# Patient Record
Sex: FEMALE | Race: WHITE | NOT HISPANIC OR LATINO | Employment: FULL TIME | URBAN - METROPOLITAN AREA
[De-identification: names, ages, dates, MRNs, and addresses within clinical notes are randomized per-mention and may not be internally consistent; named-entity substitution may affect disease eponyms.]

---

## 2017-07-03 ENCOUNTER — CONVERSION ENCOUNTER (OUTPATIENT)
Dept: GENERAL RADIOLOGY | Facility: HOSPITAL | Age: 42
End: 2017-07-03

## 2018-05-09 ENCOUNTER — OFFICE VISIT CONVERTED (OUTPATIENT)
Dept: UROLOGY | Facility: CLINIC | Age: 43
End: 2018-05-09
Attending: UROLOGY

## 2018-05-09 ENCOUNTER — CONVERSION ENCOUNTER (OUTPATIENT)
Dept: SURGERY | Facility: CLINIC | Age: 43
End: 2018-05-09

## 2018-06-11 ENCOUNTER — OFFICE VISIT CONVERTED (OUTPATIENT)
Dept: UROLOGY | Facility: CLINIC | Age: 43
End: 2018-06-11
Attending: UROLOGY

## 2018-07-16 ENCOUNTER — CONVERSION ENCOUNTER (OUTPATIENT)
Dept: SURGERY | Facility: CLINIC | Age: 43
End: 2018-07-16

## 2018-07-16 ENCOUNTER — OFFICE VISIT CONVERTED (OUTPATIENT)
Dept: UROLOGY | Facility: CLINIC | Age: 43
End: 2018-07-16
Attending: UROLOGY

## 2019-01-16 ENCOUNTER — OFFICE VISIT CONVERTED (OUTPATIENT)
Dept: UROLOGY | Facility: CLINIC | Age: 44
End: 2019-01-16
Attending: UROLOGY

## 2019-02-27 ENCOUNTER — OFFICE VISIT CONVERTED (OUTPATIENT)
Dept: CARDIOLOGY | Facility: CLINIC | Age: 44
End: 2019-02-27
Attending: INTERNAL MEDICINE

## 2019-03-11 ENCOUNTER — HOSPITAL ENCOUNTER (OUTPATIENT)
Dept: GENERAL RADIOLOGY | Facility: HOSPITAL | Age: 44
Discharge: HOME | End: 2019-03-11
Attending: NURSE PRACTITIONER

## 2019-07-20 ENCOUNTER — HOSPITAL ENCOUNTER (OUTPATIENT)
Dept: URGENT CARE | Facility: CLINIC | Age: 44
Discharge: HOME | End: 2019-07-20

## 2019-09-05 ENCOUNTER — OFFICE VISIT CONVERTED (OUTPATIENT)
Dept: CARDIOLOGY | Facility: CLINIC | Age: 44
End: 2019-09-05
Attending: INTERNAL MEDICINE

## 2020-04-23 ENCOUNTER — TELEMEDICINE CONVERTED (OUTPATIENT)
Dept: CARDIOLOGY | Facility: CLINIC | Age: 45
End: 2020-04-23
Attending: INTERNAL MEDICINE

## 2020-07-02 ENCOUNTER — HOSPITAL ENCOUNTER (OUTPATIENT)
Dept: SLEEP MEDICINE | Facility: HOSPITAL | Age: 45
Discharge: HOME | End: 2020-07-02
Attending: INTERNAL MEDICINE

## 2020-07-31 ENCOUNTER — HOSPITAL ENCOUNTER (OUTPATIENT)
Dept: PREADMISSION TESTING | Facility: HOSPITAL | Age: 45
Discharge: HOME | End: 2020-07-31
Attending: INTERNAL MEDICINE

## 2020-08-02 LAB — SARS-COV-2 RNA SPEC QL NAA+PROBE: NOT DETECTED

## 2020-08-05 ENCOUNTER — HOSPITAL ENCOUNTER (OUTPATIENT)
Dept: SLEEP MEDICINE | Facility: HOSPITAL | Age: 45
Discharge: HOME | End: 2020-08-05
Attending: INTERNAL MEDICINE

## 2020-11-11 ENCOUNTER — OFFICE VISIT CONVERTED (OUTPATIENT)
Dept: CARDIOLOGY | Facility: CLINIC | Age: 45
End: 2020-11-11
Attending: INTERNAL MEDICINE

## 2020-12-17 ENCOUNTER — HOSPITAL ENCOUNTER (OUTPATIENT)
Dept: SLEEP MEDICINE | Facility: HOSPITAL | Age: 45
Discharge: HOME | End: 2020-12-17
Attending: INTERNAL MEDICINE

## 2021-04-02 ENCOUNTER — HOSPITAL ENCOUNTER (OUTPATIENT)
Dept: GENERAL RADIOLOGY | Facility: HOSPITAL | Age: 46
Discharge: HOME | End: 2021-04-02
Attending: NURSE PRACTITIONER

## 2021-05-12 NOTE — PROGRESS NOTES
Progress Note      Patient Name: Shellie Harper   Patient ID: [id number]   Sex: Female   YOB: 1975    Primary Care Provider: An BENITEZ   Referring Provider: An BENITEZ    Visit Date: April 23, 2020    Provider: Yaniv Lozano MD   Location: [facility]   Location Address: [address] [place] [address], [facility]   [place]  [id number]   Location Phone: [phone]          Chief Complaint  · Hypertension.  · Dyslipidemia.      History Of Present Illness  Video Conferencing Visit  Shellie Harper is a 44 year old /White female with hypertension and dyslipidemia who has had some issues with gasping for air at night, awaking with tachycardia, and shortness of breath, also does suffer from daytime fatigue and snores at night. She is presenting for evaluation via video conferencing. Verbal consent obtained before beginning visit. Telehealth visit due to COVID-19.   The following staff were present during this visit: Provider only.   PAST MEDICAL HISTORY: Hyperlipidemia; Hypertension; Mitral valve prolapse.   FAMILY HISTORY: Positive for diabetes mellitus and hypertension. Patient unsure of family history of heart disease.   PSYCHOSOCIAL HISTORY: Admits mood changes and depression. Denies alcohol or tobacco use. 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   CURRENT MEDICATIONS: Effexor unknown dose, 1 tablet daily; Toprol XL 50 mg daily; Buspar 2.5 mg p.r.n.; Zyrtec 10 mg daily; fish oil daily; vitamin B12 daily; vitamin D2 daily; vitamin D3 daily. The dosage and frequency of the medications were reviewed with the patient.       Review of Systems  · Cardiovascular  o Admits  o : palpitations (fast, fluttering, or skipping beats)  o Denies  o : swelling (feet, ankles, hands), shortness of breath while walking or lying flat, chest pain or angina pectoris   · Respiratory  o Denies  o : chronic or frequent cough, asthma or wheezing      Vitals     At-home vitals  unable to be obtained.           Assessment     ASSESSMENT & PLAN:    1.  Hypertension, tolerating her medications well.  Recommended investing in a home blood pressure monitor        for monitoring.    2.  Apnea.  Patient with episodes symptomatically of apnea, as well as snoring and daytime fatigue, concerning      for sleep apnea.  We will set her up for an outpatient sleep study.      Yaniv Lozano MD  /             Electronically Signed by: Susie Beckham-, Other -Author on April 29, 2020 08:32:10 AM  Electronically Co-signed by: Yaniv Lozano MD -Reviewer on May 5, 2020 09:07:58 AM

## 2021-05-13 NOTE — PROGRESS NOTES
"   Progress Note      Patient Name: Shellie Harper   Patient ID: [id number]   Sex: Female   YOB: 1975    Primary Care Provider: An BENITEZ   Referring Provider: An BENITEZ    Visit Date: November 11, 2020    Provider: Yaniv Lozano MD   Location: [facility]   Location Address: [address] [place] [address], [facility]   [place]  [id number]   Location Phone: [phone]          Chief Complaint     Hypertension.       History Of Present Illness  Video Conferencing Visit  Shellie Harper is a 45 year old /White female with a history of hypertension and dyslipidemia who has been doing well. No new complaints or problems. Evaluation via video conferencing. Verbal consent obtained before beginning visit.   The following staff were present during this visit: Provider only.   PAST MEDICAL HISTORY: Hyperlipidemia; Hypertension; Mitral valve prolapse.   CURRENT MEDICATIONS: Toprol XL 50 mg daily; Effexor 37.5 mg daily; Buspar 2.5 mg p.r.n.; Zyrtec 10 mg daily; vitamin E daily; vitamin A daily; iron daily; folic acid daily; vitamin B6 daily; vitamin B12 daily; niacin daily; flaxseed oil + omega-3 daily; black cohosh daily; vitamin D2 daily; vitamin D3 daily.       Vitals     Patient unable to obtain vitals.  Weight 172.  Height 5'6\".           Assessment     ASSESSMENT & PLAN:    1.  Hypertension, controlled on current medical regimen.  Continued to encourage exercise and weight loss.    2.  Apneic episodes.  Patient has undergone a sleep study and is awaiting the results.             Electronically Signed by: Susie Beckham-, Other -Author on November 16, 2020 02:58:16 PM  Electronically Co-signed by: Yaniv Lozano MD -Reviewer on November 18, 2020 09:49:18 AM  "

## 2021-05-15 VITALS
WEIGHT: 172 LBS | SYSTOLIC BLOOD PRESSURE: 124 MMHG | BODY MASS INDEX: 27.64 KG/M2 | HEIGHT: 66 IN | HEART RATE: 58 BPM | DIASTOLIC BLOOD PRESSURE: 90 MMHG

## 2021-05-15 VITALS
HEIGHT: 66 IN | DIASTOLIC BLOOD PRESSURE: 88 MMHG | BODY MASS INDEX: 27.8 KG/M2 | HEART RATE: 68 BPM | WEIGHT: 173 LBS | SYSTOLIC BLOOD PRESSURE: 124 MMHG

## 2021-05-15 VITALS
DIASTOLIC BLOOD PRESSURE: 82 MMHG | HEIGHT: 66 IN | WEIGHT: 173.12 LBS | BODY MASS INDEX: 27.82 KG/M2 | SYSTOLIC BLOOD PRESSURE: 126 MMHG

## 2021-05-16 VITALS
WEIGHT: 168.5 LBS | DIASTOLIC BLOOD PRESSURE: 80 MMHG | BODY MASS INDEX: 27.08 KG/M2 | SYSTOLIC BLOOD PRESSURE: 118 MMHG | HEIGHT: 66 IN

## 2021-05-16 VITALS
HEIGHT: 66 IN | SYSTOLIC BLOOD PRESSURE: 114 MMHG | OXYGEN SATURATION: 6 % | BODY MASS INDEX: 27.06 KG/M2 | DIASTOLIC BLOOD PRESSURE: 70 MMHG | WEIGHT: 168.37 LBS

## 2021-05-16 VITALS — RESPIRATION RATE: 16 BRPM | WEIGHT: 176.25 LBS | BODY MASS INDEX: 28.33 KG/M2 | HEIGHT: 66 IN

## 2021-10-05 ENCOUNTER — LAB (OUTPATIENT)
Dept: LAB | Facility: HOSPITAL | Age: 46
End: 2021-10-05

## 2021-10-05 ENCOUNTER — TRANSCRIBE ORDERS (OUTPATIENT)
Dept: LAB | Facility: HOSPITAL | Age: 46
End: 2021-10-05

## 2021-10-05 DIAGNOSIS — E78.1 HYPERTRIGLYCERIDEMIA: ICD-10-CM

## 2021-10-05 DIAGNOSIS — E55.9 VITAMIN D DEFICIENCY: ICD-10-CM

## 2021-10-05 DIAGNOSIS — E53.8 VITAMIN B12 DEFICIENCY: ICD-10-CM

## 2021-10-05 DIAGNOSIS — E78.1 HYPERTRIGLYCERIDEMIA: Primary | ICD-10-CM

## 2021-10-05 DIAGNOSIS — E78.5 HYPERLIPIDEMIA, UNSPECIFIED HYPERLIPIDEMIA TYPE: ICD-10-CM

## 2021-10-05 LAB
25(OH)D3 SERPL-MCNC: 34.4 NG/ML
ALBUMIN SERPL-MCNC: 4.6 G/DL (ref 3.5–5.2)
ALBUMIN/GLOB SERPL: 1.7 G/DL
ALP SERPL-CCNC: 60 U/L (ref 39–117)
ALT SERPL W P-5'-P-CCNC: 10 U/L (ref 1–33)
ANION GAP SERPL CALCULATED.3IONS-SCNC: 8.6 MMOL/L (ref 5–15)
AST SERPL-CCNC: 16 U/L (ref 1–32)
BILIRUB SERPL-MCNC: 0.2 MG/DL (ref 0–1.2)
BUN SERPL-MCNC: 18 MG/DL (ref 6–20)
BUN/CREAT SERPL: 20.5 (ref 7–25)
CALCIUM SPEC-SCNC: 9.4 MG/DL (ref 8.6–10.5)
CHLORIDE SERPL-SCNC: 104 MMOL/L (ref 98–107)
CHOLEST SERPL-MCNC: 218 MG/DL (ref 0–200)
CO2 SERPL-SCNC: 24.4 MMOL/L (ref 22–29)
CREAT SERPL-MCNC: 0.88 MG/DL (ref 0.57–1)
GFR SERPL CREATININE-BSD FRML MDRD: 69 ML/MIN/1.73
GLOBULIN UR ELPH-MCNC: 2.7 GM/DL
GLUCOSE SERPL-MCNC: 111 MG/DL (ref 65–99)
HDLC SERPL-MCNC: 27 MG/DL (ref 40–60)
LDLC SERPL CALC-MCNC: 110 MG/DL (ref 0–100)
LDLC/HDLC SERPL: 3.63 {RATIO}
POTASSIUM SERPL-SCNC: 4.2 MMOL/L (ref 3.5–5.2)
PROT SERPL-MCNC: 7.3 G/DL (ref 6–8.5)
SODIUM SERPL-SCNC: 137 MMOL/L (ref 136–145)
TRIGL SERPL-MCNC: 465 MG/DL (ref 0–150)
VIT B12 BLD-MCNC: 363 PG/ML (ref 211–946)
VLDLC SERPL-MCNC: 81 MG/DL (ref 5–40)

## 2021-10-05 PROCEDURE — 82306 VITAMIN D 25 HYDROXY: CPT

## 2021-10-05 PROCEDURE — 82607 VITAMIN B-12: CPT

## 2021-10-05 PROCEDURE — 36415 COLL VENOUS BLD VENIPUNCTURE: CPT

## 2021-10-05 PROCEDURE — 80053 COMPREHEN METABOLIC PANEL: CPT

## 2021-10-05 PROCEDURE — 80061 LIPID PANEL: CPT

## 2021-11-09 PROBLEM — I10 ESSENTIAL HYPERTENSION: Status: ACTIVE | Noted: 2021-11-09

## 2021-11-09 PROBLEM — I34.1 MVP (MITRAL VALVE PROLAPSE): Status: ACTIVE | Noted: 2021-11-09

## 2021-11-11 ENCOUNTER — OFFICE VISIT (OUTPATIENT)
Dept: CARDIOLOGY | Facility: CLINIC | Age: 46
End: 2021-11-11

## 2021-11-11 VITALS
HEART RATE: 82 BPM | BODY MASS INDEX: 28.77 KG/M2 | WEIGHT: 179 LBS | HEIGHT: 66 IN | DIASTOLIC BLOOD PRESSURE: 87 MMHG | SYSTOLIC BLOOD PRESSURE: 132 MMHG

## 2021-11-11 DIAGNOSIS — I10 ESSENTIAL HYPERTENSION: Primary | ICD-10-CM

## 2021-11-11 DIAGNOSIS — E78.1 HYPERTRIGLYCERIDEMIA: ICD-10-CM

## 2021-11-11 DIAGNOSIS — I34.1 MVP (MITRAL VALVE PROLAPSE): ICD-10-CM

## 2021-11-11 PROCEDURE — 99213 OFFICE O/P EST LOW 20 MIN: CPT | Performed by: INTERNAL MEDICINE

## 2021-11-11 RX ORDER — METOPROLOL SUCCINATE 50 MG/1
50 TABLET, EXTENDED RELEASE ORAL DAILY
COMMUNITY
Start: 2021-10-31 | End: 2021-12-01

## 2021-11-11 RX ORDER — CETIRIZINE HYDROCHLORIDE 10 MG/1
10 TABLET ORAL DAILY
COMMUNITY

## 2021-11-11 RX ORDER — ICOSAPENT ETHYL 1000 MG/1
2 CAPSULE ORAL 2 TIMES DAILY
COMMUNITY
Start: 2021-08-19

## 2021-11-11 RX ORDER — ERGOCALCIFEROL 1.25 MG/1
CAPSULE ORAL
COMMUNITY
Start: 2021-08-16

## 2021-11-11 RX ORDER — BUSPIRONE HYDROCHLORIDE 10 MG/1
1 TABLET ORAL 2 TIMES DAILY PRN
COMMUNITY
Start: 2021-08-19

## 2021-11-11 RX ORDER — FENOFIBRATE 145 MG/1
145 TABLET, COATED ORAL DAILY
COMMUNITY
Start: 2021-08-19

## 2021-11-11 RX ORDER — VENLAFAXINE HYDROCHLORIDE 37.5 MG/1
CAPSULE, EXTENDED RELEASE ORAL DAILY
COMMUNITY
Start: 2021-08-19

## 2021-11-11 NOTE — PROGRESS NOTES
"Chief Complaint  Hypertension    Subjective    Patient is doing well denies any complaints or new problems or issues    Past Medical History:   Diagnosis Date   • Hyperlipidemia    • Hypertension    • Mitral valve prolapse          Current Outpatient Medications:   •  busPIRone (BUSPAR) 10 MG tablet, Take 1 tablet by mouth 2 (Two) Times a Day As Needed., Disp: , Rfl:   •  cetirizine (zyrTEC) 10 MG tablet, Take 10 mg by mouth Daily., Disp: , Rfl:   •  fenofibrate (TRICOR) 145 MG tablet, Take 145 mg by mouth Daily., Disp: , Rfl:   •  metoprolol succinate XL (TOPROL-XL) 50 MG 24 hr tablet, Take 50 mg by mouth Daily., Disp: , Rfl:   •  Vascepa 1 g capsule capsule, Take 2 capsules by mouth 2 (Two) Times a Day., Disp: , Rfl:   •  venlafaxine XR (EFFEXOR-XR) 37.5 MG 24 hr capsule, Daily., Disp: , Rfl:   •  vitamin D (ERGOCALCIFEROL) 1.25 MG (78553 UT) capsule capsule, Every 7 (Seven) Days., Disp: , Rfl:     There are no discontinued medications.  Allergies   Allergen Reactions   • Sertraline Palpitations        Social History     Tobacco Use   • Smoking status: Never Smoker   • Smokeless tobacco: Never Used   Vaping Use   • Vaping Use: Never used   Substance Use Topics   • Alcohol use: Never   • Drug use: Never       Family History   Problem Relation Age of Onset   • Diabetes Mother    • Cancer Father         Objective     /87   Pulse 82   Ht 167.6 cm (66\")   Wt 81.2 kg (179 lb)   BMI 28.89 kg/m²       Physical Exam    General Appearance:   · no acute distress  · Alert and oriented x3  HENT:   · lips not cyanotic  · Atraumatic  Neck:  · No jvd   · supple  Respiratory:  · no respiratory distress  · normal breath sounds  · no rales  Cardiovascular:  · Regular rate and rhythm  · no S3, no S4   · no murmur  · no rub  Extremities  · No cyanosis  · lower extremity edema: none    Skin:   · warm, dry  · No rashes      Result Review :     No results found for: PROBNP  CMP    CMP 10/5/21   Glucose 111 (A)   BUN 18 "   Creatinine 0.88   eGFR Non African Am 69   Sodium 137   Potassium 4.2   Chloride 104   Calcium 9.4   Albumin 4.60   Total Bilirubin 0.2   Alkaline Phosphatase 60   AST (SGOT) 16   ALT (SGPT) 10   (A) Abnormal value               No results found for: TSH   No results found for: FREET4   No results found for: DDIMERQUANT  Magnesium   Date Value Ref Range Status   09/09/2019 2.10 1.60 - 2.30 mg/dL Final      No results found for: DIGOXIN   Lab Results   Component Value Date    TROPONINT <0.01 09/24/2019           Lipid Panel    Lipid Panel 10/5/21   Total Cholesterol 218 (A)   Triglycerides 465 (A)   HDL Cholesterol 27 (A)   VLDL Cholesterol 81 (A)   LDL Cholesterol  110 (A)   LDL/HDL Ratio 3.63   (A) Abnormal value            Lab Results   Component Value Date    POCTROP 0.01 09/09/2019                      Diagnoses and all orders for this visit:    1. Essential hypertension (Primary)  Assessment & Plan:  Blood pressure is controlled in office today continue with Toprol 50 mg once a day encourage exercise and low-sodium diet      2. MVP (mitral valve prolapse)    3. Hypertriglyceridemia  Assessment & Plan:  L with elevated triglycerides has been started on fenofibrate and Vascepa in September also gave her handout regarding dietary modification.  Recommended a course of aerobic activity at least 30 minutes a day 5 times per week and a goal weight loss of 10 pounds to also help improve her triglycerides schedule a repeat lipids done            Follow Up     Return in about 1 year (around 11/11/2022).          Patient was given instructions and counseling regarding her condition or for health maintenance advice. Please see specific information pulled into the AVS if appropriate.

## 2021-11-11 NOTE — ASSESSMENT & PLAN NOTE
Blood pressure is controlled in office today continue with Toprol 50 mg once a day encourage exercise and low-sodium diet

## 2021-11-11 NOTE — PATIENT INSTRUCTIONS
Cholesterol Content in Foods  Cholesterol is a waxy, fat-like substance that helps to carry fat in the blood. The body needs cholesterol in small amounts, but too much cholesterol can cause damage to the arteries and heart.  Most people should eat less than 200 milligrams (mg) of cholesterol a day.  Foods with cholesterol    Cholesterol is found in animal-based foods, such as meat, seafood, and dairy. Generally, low-fat dairy and lean meats have less cholesterol than full-fat dairy and fatty meats. The milligrams of cholesterol per serving (mg per serving) of common cholesterol-containing foods are listed below.  Meat and other proteins  · Egg -- one large whole egg has 186 mg.  · Veal shank -- 4 oz has 141 mg.  · Lean ground turkey (93% lean) -- 4 oz has 118 mg.  · Fat-trimmed lamb loin -- 4 oz has 106 mg.  · Lean ground beef (90% lean) -- 4 oz has 100 mg.  · Lobster -- 3.5 oz has 90 mg.  · Pork loin chops -- 4 oz has 86 mg.  · Canned salmon -- 3.5 oz has 83 mg.  · Fat-trimmed beef top loin -- 4 oz has 78 mg.  · Frankfurter -- 1 miguel (3.5 oz) has 77 mg.  · Crab -- 3.5 oz has 71 mg.  · Roasted chicken without skin, white meat -- 4 oz has 66 mg.  · Light bologna -- 2 oz has 45 mg.  · Deli-cut turkey -- 2 oz has 31 mg.  · Canned tuna -- 3.5 oz has 31 mg.  · Angel -- 1 oz has 29 mg.  · Oysters and mussels (raw) -- 3.5 oz has 25 mg.  · Mackerel -- 1 oz has 22 mg.  · Trout -- 1 oz has 20 mg.  · Pork sausage -- 1 link (1 oz) has 17 mg.  · Salem -- 1 oz has 16 mg.  · Tilapia -- 1 oz has 14 mg.  Dairy  · Soft-serve ice cream -- ½ cup (4 oz) has 103 mg.  · Whole-milk yogurt -- 1 cup (8 oz) has 29 mg.  · Cheddar cheese -- 1 oz has 28 mg.  · American cheese -- 1 oz has 28 mg.  · Whole milk -- 1 cup (8 oz) has 23 mg.  · 2% milk -- 1 cup (8 oz) has 18 mg.  · Cream cheese -- 1 tablespoon (Tbsp) has 15 mg.  · Cottage cheese -- ½ cup (4 oz) has 14 mg.  · Low-fat (1%) milk -- 1 cup (8 oz) has 10 mg.  · Sour cream -- 1 Tbsp has 8.5  mg.  · Low-fat yogurt -- 1 cup (8 oz) has 8 mg.  · Nonfat Greek yogurt -- 1 cup (8 oz) has 7 mg.  · Half-and-half cream -- 1 Tbsp has 5 mg.  Fats and oils  · Cod liver oil -- 1 tablespoon (Tbsp) has 82 mg.  · Butter -- 1 Tbsp has 15 mg.  · Lard -- 1 Tbsp has 14 mg.  · Angel grease -- 1 Tbsp has 14 mg.  · Mayonnaise -- 1 Tbsp has 5-10 mg.  · Margarine -- 1 Tbsp has 3-10 mg.  Exact amounts of cholesterol in these foods may vary depending on specific ingredients and brands.  Foods without cholesterol  Most plant-based foods do not have cholesterol unless you combine them with a food that has cholesterol. Foods without cholesterol include:  · Grains and cereals.  · Vegetables.  · Fruits.  · Vegetable oils, such as olive, canola, and sunflower oil.  · Legumes, such as peas, beans, and lentils.  · Nuts and seeds.  · Egg whites.  Summary  · The body needs cholesterol in small amounts, but too much cholesterol can cause damage to the arteries and heart.  · Most people should eat less than 200 milligrams (mg) of cholesterol a day.  This information is not intended to replace advice given to you by your health care provider. Make sure you discuss any questions you have with your health care provider.  Document Revised: 05/10/2021 Document Reviewed: 05/10/2021  Elsevier Patient Education © 2021 [employer] Inc.

## 2021-11-11 NOTE — ASSESSMENT & PLAN NOTE
L with elevated triglycerides has been started on fenofibrate and Vascepa in September also gave her handout regarding dietary modification.  Recommended a course of aerobic activity at least 30 minutes a day 5 times per week and a goal weight loss of 10 pounds to also help improve her triglycerides schedule a repeat lipids done

## 2021-12-01 RX ORDER — METOPROLOL SUCCINATE 50 MG/1
TABLET, EXTENDED RELEASE ORAL
Qty: 90 TABLET | Refills: 3 | Status: SHIPPED | OUTPATIENT
Start: 2021-12-01 | End: 2022-11-15 | Stop reason: SDUPTHER

## 2022-02-07 ENCOUNTER — OFFICE VISIT (OUTPATIENT)
Dept: OBSTETRICS AND GYNECOLOGY | Facility: CLINIC | Age: 47
End: 2022-02-07

## 2022-02-07 VITALS
HEIGHT: 66 IN | BODY MASS INDEX: 29.32 KG/M2 | DIASTOLIC BLOOD PRESSURE: 74 MMHG | WEIGHT: 182.4 LBS | HEART RATE: 88 BPM | SYSTOLIC BLOOD PRESSURE: 120 MMHG

## 2022-02-07 DIAGNOSIS — Z12.11 SCREENING FOR COLON CANCER: ICD-10-CM

## 2022-02-07 DIAGNOSIS — Z12.31 ENCOUNTER FOR SCREENING MAMMOGRAM FOR MALIGNANT NEOPLASM OF BREAST: ICD-10-CM

## 2022-02-07 DIAGNOSIS — Z01.419 WELL WOMAN EXAM: Primary | ICD-10-CM

## 2022-02-07 PROCEDURE — 99396 PREV VISIT EST AGE 40-64: CPT | Performed by: NURSE PRACTITIONER

## 2022-02-07 PROCEDURE — G0123 SCREEN CERV/VAG THIN LAYER: HCPCS | Performed by: NURSE PRACTITIONER

## 2022-02-07 PROCEDURE — 87624 HPV HI-RISK TYP POOLED RSLT: CPT | Performed by: NURSE PRACTITIONER

## 2022-02-07 NOTE — PROGRESS NOTES
"  HPI:   46 y.o..Presents for well woman exam. Contraception or HRT: APCONTRACEPTIONHRT: Contraception:  Tubal ligation  Menses:   q 21 days, lasts 4 days, changes products q 2 hrs on heaviest days.   Pain:  None  Last pap abnormal, LSIL -, followed by colpo-benign  Complaints: Pt has no complaints today.  Patient reports that she is not currently experiencing any symptoms of urinary incontinence.      Past Medical History:   Diagnosis Date   • Hyperlipidemia    • Hypertension    • Mitral valve prolapse       Past Surgical History:   Procedure Laterality Date   •  SECTION     • LAPAROSCOPIC CHOLECYSTECTOMY     • TUBAL ABDOMINAL LIGATION        Family History   Problem Relation Age of Onset   • Diabetes Mother    • Colon cancer Father 74   • Liver cancer Father 74        PCP: does manage PMHx and preventative labs      PHYSICAL EXAM: Chaperone present /74   Pulse 88   Ht 167.6 cm (66\")   Wt 82.7 kg (182 lb 6.4 oz)   LMP 2022 (Approximate)   BMI 29.44 kg/m²   General- NAD, alert and oriented, appropriate  Psych- Normal mood, good memory  Neck- No masses, no thyroid enlargement  Lymphatic- No palpable neck, axillary, or groin nodes  CV- Regular rhythm, no murmurs  Resp- CTA to bases, no wheezes  Abdomen- Soft, non distended, non tender, no masses  Breast left-  Bilaterally symmetrical, no masses, non tender, no nipple discharge  Breast right- Bilaterally symmetrical, no masses, non tender, no nipple discharge  External genitalia- Normal female, no lesions  Urethra/meatus- Normal, no masses, non tender, no prolapse  Bladder- Normal, no masses, non tender, no prolapse  Vagina- Normal, no atrophy, no lesions, no discharge, no prolapse  Cvx- Normal, no lesions, no discharge, No cervical motion tenderness  Uterus- Normal size, shape & consistency.  Non tender, mobile, & no prolapse  Adnexa- No mass, non tender  Anus/Rectum/Perineum- Not performed  Ext- No edema, no cyanosis    Skin- No " lesions, no rashes, no acanthosis nigricans      ASSESSMENT and PLAN:  WWE    Diagnoses and all orders for this visit:    1. Well woman exam (Primary)  -     IGP,rfx Aptima HPV All Pth    2. Encounter for screening mammogram for malignant neoplasm of breast  -     Mammo Screening Digital Tomosynthesis Bilateral With CAD; Future    3. Screening for colon cancer  -     Ambulatory Referral For Screening Colonoscopy      Preventative:   BREAST HEALTH- Monthly self breast exam importance and how to reviewed. MMG and/or MRI (prn) reviewed per society guidelines and her individual history. Screen: Updated today.  CERVICAL CANCER Screening- Reviewed current ASCCP guidelines for screening w and wo cotest HPV, age specific.  Screen: Updated today.  COLON CANCER Screening- Reviewed current medical society guidelines and options.  Screen:  Updated today.  Follow up PCP/Specialist PMHx and Labs  Myriad: Does not qualify.    She understands the importance of having any ordered tests to be performed in a timely fashion.  The risks of not performing them include, but are not limited to, advanced cancer stages, bone loss from osteoporosis and/or subsequent increase in morbidity and/or mortality.  She is encouraged to review her results online and/or contact or office if she has questions.     Follow Up:  Return for as needed.  Sia Yun, APRN  02/07/2022

## 2022-02-16 ENCOUNTER — TELEPHONE (OUTPATIENT)
Dept: OBSTETRICS AND GYNECOLOGY | Facility: CLINIC | Age: 47
End: 2022-02-16

## 2022-02-16 LAB
CONV .: ABNORMAL
CYTOLOGIST CVX/VAG CYTO: ABNORMAL
CYTOLOGY CVX/VAG DOC CYTO: ABNORMAL
CYTOLOGY CVX/VAG DOC THIN PREP: ABNORMAL
DX ICD CODE: ABNORMAL
DX ICD CODE: ABNORMAL
HIV 1 & 2 AB SER-IMP: ABNORMAL
HPV I/H RISK 4 DNA CVX QL PROBE+SIG AMP: POSITIVE
OTHER STN SPEC: ABNORMAL
PATHOLOGIST CVX/VAG CYTO: ABNORMAL
RECOM F/U CVX/VAG CYTO: ABNORMAL
STAT OF ADQ CVX/VAG CYTO-IMP: ABNORMAL

## 2022-02-16 NOTE — TELEPHONE ENCOUNTER
----- Message from ELI Strickland sent at 2/16/2022  3:59 PM EST -----  LSIL, HPV+, Mgmt of results during post colposcopy surveillance recommend repeat cotest in 1 year

## 2022-03-09 ENCOUNTER — LAB (OUTPATIENT)
Dept: LAB | Facility: HOSPITAL | Age: 47
End: 2022-03-09

## 2022-03-09 ENCOUNTER — TRANSCRIBE ORDERS (OUTPATIENT)
Dept: LAB | Facility: HOSPITAL | Age: 47
End: 2022-03-09

## 2022-03-09 DIAGNOSIS — E78.2 MIXED HYPERLIPIDEMIA: ICD-10-CM

## 2022-03-09 DIAGNOSIS — E55.9 VITAMIN D DEFICIENCY: ICD-10-CM

## 2022-03-09 DIAGNOSIS — E53.8 VITAMIN B12 DEFICIENCY: Primary | ICD-10-CM

## 2022-03-09 DIAGNOSIS — E53.8 VITAMIN B12 DEFICIENCY: ICD-10-CM

## 2022-03-09 LAB
25(OH)D3 SERPL-MCNC: 25.4 NG/ML (ref 30–100)
ALBUMIN SERPL-MCNC: 4.5 G/DL (ref 3.5–5.2)
ALBUMIN/GLOB SERPL: 1.7 G/DL
ALP SERPL-CCNC: 54 U/L (ref 39–117)
ALT SERPL W P-5'-P-CCNC: 22 U/L (ref 1–33)
ANION GAP SERPL CALCULATED.3IONS-SCNC: 11.9 MMOL/L (ref 5–15)
AST SERPL-CCNC: 19 U/L (ref 1–32)
BILIRUB SERPL-MCNC: 0.4 MG/DL (ref 0–1.2)
BUN SERPL-MCNC: 13 MG/DL (ref 6–20)
BUN/CREAT SERPL: 14.4 (ref 7–25)
CALCIUM SPEC-SCNC: 9.6 MG/DL (ref 8.6–10.5)
CHLORIDE SERPL-SCNC: 100 MMOL/L (ref 98–107)
CHOLEST SERPL-MCNC: 217 MG/DL (ref 0–200)
CO2 SERPL-SCNC: 25.1 MMOL/L (ref 22–29)
CREAT SERPL-MCNC: 0.9 MG/DL (ref 0.57–1)
EGFRCR SERPLBLD CKD-EPI 2021: 80 ML/MIN/1.73
GLOBULIN UR ELPH-MCNC: 2.7 GM/DL
GLUCOSE SERPL-MCNC: 128 MG/DL (ref 65–99)
HDLC SERPL-MCNC: 35 MG/DL (ref 40–60)
LDLC SERPL CALC-MCNC: 143 MG/DL (ref 0–100)
LDLC/HDLC SERPL: 3.97 {RATIO}
POTASSIUM SERPL-SCNC: 4.4 MMOL/L (ref 3.5–5.2)
PROT SERPL-MCNC: 7.2 G/DL (ref 6–8.5)
SODIUM SERPL-SCNC: 137 MMOL/L (ref 136–145)
TRIGL SERPL-MCNC: 216 MG/DL (ref 0–150)
VIT B12 BLD-MCNC: 341 PG/ML (ref 211–946)
VLDLC SERPL-MCNC: 39 MG/DL (ref 5–40)

## 2022-03-09 PROCEDURE — 82306 VITAMIN D 25 HYDROXY: CPT

## 2022-03-09 PROCEDURE — 80053 COMPREHEN METABOLIC PANEL: CPT

## 2022-03-09 PROCEDURE — 82607 VITAMIN B-12: CPT

## 2022-03-09 PROCEDURE — 80061 LIPID PANEL: CPT

## 2022-03-09 PROCEDURE — 36415 COLL VENOUS BLD VENIPUNCTURE: CPT

## 2022-03-14 ENCOUNTER — PREP FOR SURGERY (OUTPATIENT)
Dept: OTHER | Facility: HOSPITAL | Age: 47
End: 2022-03-14

## 2022-03-14 ENCOUNTER — TELEPHONE (OUTPATIENT)
Dept: GASTROENTEROLOGY | Facility: CLINIC | Age: 47
End: 2022-03-14

## 2022-03-14 ENCOUNTER — CLINICAL SUPPORT (OUTPATIENT)
Dept: GASTROENTEROLOGY | Facility: CLINIC | Age: 47
End: 2022-03-14

## 2022-03-14 DIAGNOSIS — Z12.11 COLON CANCER SCREENING: Primary | ICD-10-CM

## 2022-03-14 RX ORDER — BUSPIRONE HYDROCHLORIDE 10 MG/1
1 TABLET ORAL 2 TIMES DAILY
Status: ON HOLD | COMMUNITY
Start: 2022-02-21 | End: 2022-07-11

## 2022-03-14 RX ORDER — SODIUM, POTASSIUM,MAG SULFATES 17.5-3.13G
2 SOLUTION, RECONSTITUTED, ORAL ORAL TAKE AS DIRECTED
Qty: 354 ML | Refills: 0 | Status: ON HOLD | OUTPATIENT
Start: 2022-03-14 | End: 2022-07-11

## 2022-03-14 NOTE — PROGRESS NOTES
SPOKE WITH PT ON A DATE FOR COLONOSCOPY OF 7/11/22 . MADE SURE CHART WAS UP TO DATE. WENT OVER PREP AND MAILED OUT INSTRUCTIONS. PUT IN ORDER FOR COLON AND SENT PREP TO PHARMACY

## 2022-03-14 NOTE — TELEPHONE ENCOUNTER
Shellie Harper  REASON FOR CALL encounter for colon screening  SENT IN PREP suprep  Past Medical History:   Diagnosis Date   • Hyperlipidemia    • Hypertension    • Mitral valve prolapse      Allergies   Allergen Reactions   • Sertraline Palpitations     Past Surgical History:   Procedure Laterality Date   •  SECTION     • LAPAROSCOPIC CHOLECYSTECTOMY     • TUBAL ABDOMINAL LIGATION       Social History     Socioeconomic History   • Marital status:    • Number of children: 1   Tobacco Use   • Smoking status: Never Smoker   • Smokeless tobacco: Never Used   Vaping Use   • Vaping Use: Never used   Substance and Sexual Activity   • Alcohol use: Never   • Drug use: Never   • Sexual activity: Defer     Birth control/protection: Surgical     Family History   Problem Relation Age of Onset   • Diabetes Mother    • Colon cancer Father 74   • Liver cancer Father 74       Current Outpatient Medications:   •  busPIRone (BUSPAR) 10 MG tablet, Take 1 tablet by mouth 2 (Two) Times a Day As Needed., Disp: , Rfl:   •  busPIRone (BUSPAR) 10 MG tablet, Take 1 tablet by mouth 2 (Two) Times a Day., Disp: , Rfl:   •  cetirizine (zyrTEC) 10 MG tablet, Take 10 mg by mouth Daily., Disp: , Rfl:   •  fenofibrate (TRICOR) 145 MG tablet, Take 145 mg by mouth Daily., Disp: , Rfl:   •  metoprolol succinate XL (TOPROL-XL) 50 MG 24 hr tablet, TAKE ONE TABLET BY MOUTH DAILY, Disp: 90 tablet, Rfl: 3  •  Vascepa 1 g capsule capsule, Take 2 capsules by mouth 2 (Two) Times a Day., Disp: , Rfl:   •  venlafaxine XR (EFFEXOR-XR) 37.5 MG 24 hr capsule, Daily., Disp: , Rfl:   •  vitamin D (ERGOCALCIFEROL) 1.25 MG (56177 UT) capsule capsule, Every 7 (Seven) Days., Disp: , Rfl:

## 2022-04-06 ENCOUNTER — HOSPITAL ENCOUNTER (OUTPATIENT)
Dept: MAMMOGRAPHY | Facility: HOSPITAL | Age: 47
Discharge: HOME | End: 2022-04-06
Admitting: NURSE PRACTITIONER

## 2022-04-06 DIAGNOSIS — Z12.31 ENCOUNTER FOR SCREENING MAMMOGRAM FOR MALIGNANT NEOPLASM OF BREAST: ICD-10-CM

## 2022-04-06 PROCEDURE — 77063 BREAST TOMOSYNTHESIS BI: CPT

## 2022-04-06 PROCEDURE — 77067 SCR MAMMO BI INCL CAD: CPT

## 2022-06-27 ENCOUNTER — TELEPHONE (OUTPATIENT)
Dept: GASTROENTEROLOGY | Facility: CLINIC | Age: 47
End: 2022-06-27

## 2022-06-27 NOTE — TELEPHONE ENCOUNTER
Attempted to call patient in regards to procedure reminder. Patient did not answer/no vm.   [employer] reminder also sent.

## 2022-07-11 ENCOUNTER — ANESTHESIA EVENT (OUTPATIENT)
Dept: GASTROENTEROLOGY | Facility: HOSPITAL | Age: 47
End: 2022-07-11

## 2022-07-11 ENCOUNTER — HOSPITAL ENCOUNTER (OUTPATIENT)
Facility: HOSPITAL | Age: 47
Setting detail: HOSPITAL OUTPATIENT SURGERY
Discharge: HOME | End: 2022-07-11
Attending: INTERNAL MEDICINE | Admitting: INTERNAL MEDICINE

## 2022-07-11 ENCOUNTER — ANESTHESIA (OUTPATIENT)
Dept: GASTROENTEROLOGY | Facility: HOSPITAL | Age: 47
End: 2022-07-11

## 2022-07-11 VITALS
OXYGEN SATURATION: 99 % | DIASTOLIC BLOOD PRESSURE: 72 MMHG | SYSTOLIC BLOOD PRESSURE: 113 MMHG | BODY MASS INDEX: 28.7 KG/M2 | TEMPERATURE: 97.5 F | HEIGHT: 66 IN | HEART RATE: 80 BPM | WEIGHT: 178.57 LBS | RESPIRATION RATE: 22 BRPM

## 2022-07-11 DIAGNOSIS — Z12.11 COLON CANCER SCREENING: ICD-10-CM

## 2022-07-11 PROCEDURE — 45385 COLONOSCOPY W/LESION REMOVAL: CPT | Performed by: INTERNAL MEDICINE

## 2022-07-11 PROCEDURE — 25010000002 PROPOFOL 10 MG/ML EMULSION: Performed by: NURSE ANESTHETIST, CERTIFIED REGISTERED

## 2022-07-11 PROCEDURE — 88305 TISSUE EXAM BY PATHOLOGIST: CPT | Performed by: INTERNAL MEDICINE

## 2022-07-11 RX ORDER — SODIUM CHLORIDE, SODIUM LACTATE, POTASSIUM CHLORIDE, CALCIUM CHLORIDE 600; 310; 30; 20 MG/100ML; MG/100ML; MG/100ML; MG/100ML
30 INJECTION, SOLUTION INTRAVENOUS CONTINUOUS
Status: DISCONTINUED | OUTPATIENT
Start: 2022-07-11 | End: 2022-07-11 | Stop reason: HOSPADM

## 2022-07-11 RX ORDER — PROPOFOL 10 MG/ML
VIAL (ML) INTRAVENOUS AS NEEDED
Status: DISCONTINUED | OUTPATIENT
Start: 2022-07-11 | End: 2022-07-11 | Stop reason: SURG

## 2022-07-11 RX ORDER — LIDOCAINE HYDROCHLORIDE 20 MG/ML
INJECTION, SOLUTION EPIDURAL; INFILTRATION; INTRACAUDAL; PERINEURAL AS NEEDED
Status: DISCONTINUED | OUTPATIENT
Start: 2022-07-11 | End: 2022-07-11 | Stop reason: SURG

## 2022-07-11 RX ORDER — SODIUM CHLORIDE, SODIUM LACTATE, POTASSIUM CHLORIDE, CALCIUM CHLORIDE 600; 310; 30; 20 MG/100ML; MG/100ML; MG/100ML; MG/100ML
INJECTION, SOLUTION INTRAVENOUS CONTINUOUS PRN
Status: DISCONTINUED | OUTPATIENT
Start: 2022-07-11 | End: 2022-07-11 | Stop reason: SURG

## 2022-07-11 RX ADMIN — SODIUM CHLORIDE, POTASSIUM CHLORIDE, SODIUM LACTATE AND CALCIUM CHLORIDE 30 ML/HR: 600; 310; 30; 20 INJECTION, SOLUTION INTRAVENOUS at 10:29

## 2022-07-11 RX ADMIN — LIDOCAINE HYDROCHLORIDE 100 MG: 20 INJECTION, SOLUTION EPIDURAL; INFILTRATION; INTRACAUDAL; PERINEURAL at 11:06

## 2022-07-11 RX ADMIN — PROPOFOL 100 MG: 10 INJECTION, EMULSION INTRAVENOUS at 11:11

## 2022-07-11 RX ADMIN — PROPOFOL 200 MCG/KG/MIN: 10 INJECTION, EMULSION INTRAVENOUS at 11:06

## 2022-07-11 RX ADMIN — PROPOFOL 100 MG: 10 INJECTION, EMULSION INTRAVENOUS at 11:06

## 2022-07-11 RX ADMIN — SODIUM CHLORIDE, POTASSIUM CHLORIDE, SODIUM LACTATE AND CALCIUM CHLORIDE: 600; 310; 30; 20 INJECTION, SOLUTION INTRAVENOUS at 10:56

## 2022-07-11 NOTE — H&P
"Pre Procedure History & Physical    Chief Complaint:   Screening colonoscopy    Subjective     HPI:   45 yo F here for screening colonoscopy.    Past Medical History:   Past Medical History:   Diagnosis Date   • Anxiety    • Hyperlipidemia    • Hypertension    • Mitral valve prolapse    • PONV (postoperative nausea and vomiting)        Past Surgical History:  Past Surgical History:   Procedure Laterality Date   •  SECTION     • LAPAROSCOPIC CHOLECYSTECTOMY     • TUBAL ABDOMINAL LIGATION         Family History:  Family History   Problem Relation Age of Onset   • Diabetes Mother    • Colon cancer Father 74   • Liver cancer Father 74       Social History:   reports that she has never smoked. She has never used smokeless tobacco. She reports that she does not drink alcohol and does not use drugs.    Medications:   Medications Prior to Admission   Medication Sig Dispense Refill Last Dose   • busPIRone (BUSPAR) 10 MG tablet Take 1 tablet by mouth 2 (Two) Times a Day As Needed.      • busPIRone (BUSPAR) 10 MG tablet Take 1 tablet by mouth 2 (Two) Times a Day.      • cetirizine (zyrTEC) 10 MG tablet Take 10 mg by mouth Daily.      • fenofibrate (TRICOR) 145 MG tablet Take 145 mg by mouth Daily.      • metoprolol succinate XL (TOPROL-XL) 50 MG 24 hr tablet TAKE ONE TABLET BY MOUTH DAILY 90 tablet 3    • sodium-potassium-magnesium sulfates (Suprep Bowel Prep Kit) 17.5-3.13-1.6 GM/177ML solution oral solution Take 2 bottles by mouth Take As Directed. Take as directed by prescriber's office 354 mL 0    • Vascepa 1 g capsule capsule Take 2 capsules by mouth 2 (Two) Times a Day.      • venlafaxine XR (EFFEXOR-XR) 37.5 MG 24 hr capsule Daily.      • vitamin D (ERGOCALCIFEROL) 1.25 MG (86778 UT) capsule capsule Every 7 (Seven) Days.          Allergies:  Sertraline    ROS:    Pertinent items are noted in HPI     Objective     Height 167.6 cm (66\"), weight 81 kg (178 lb 9.2 oz).    Physical Exam   Constitutional: Pt is " oriented to person, place, and time and well-developed, well-nourished, and in no distress.   Mouth/Throat: Oropharynx is clear and moist.   Neck: Normal range of motion.   Cardiovascular: Normal rate, regular rhythm and normal heart sounds.    Pulmonary/Chest: Effort normal and breath sounds normal.   Abdominal: Soft. Nontender  Skin: Skin is warm and dry.   Psychiatric: Mood, memory, affect and judgment normal.     Assessment & Plan     Diagnosis:  Screening colonoscopy    Anticipated Surgical Procedure:  Colonoscopy    The risks, benefits, and alternatives of this procedure have been discussed with the patient or the responsible party- the patient understands and agrees to proceed.

## 2022-07-11 NOTE — ANESTHESIA PREPROCEDURE EVALUATION
Anesthesia Evaluation     Patient summary reviewed and Nursing notes reviewed   history of anesthetic complications: PONV  NPO Solid Status: > 8 hours  NPO Liquid Status: > 2 hours           Airway   Mallampati: II  TM distance: >3 FB  Neck ROM: full  No difficulty expected  Dental      Pulmonary - negative pulmonary ROS and normal exam    breath sounds clear to auscultation  Cardiovascular - normal exam  Exercise tolerance: good (4-7 METS)    Rhythm: regular  Rate: normal    (+) hypertension, valvular problems/murmurs, hyperlipidemia,       Neuro/Psych  (+) psychiatric history,    GI/Hepatic/Renal/Endo - negative ROS     Musculoskeletal (-) negative ROS    Abdominal    Substance History - negative use     OB/GYN negative ob/gyn ROS         Other - negative ROS                       Anesthesia Plan    ASA 1     general     (Total IV Anesthesia    Patient understands anesthesia not responsible for dental damage.  )  intravenous induction     Anesthetic plan, risks, benefits, and alternatives have been provided, discussed and informed consent has been obtained with: patient.    Plan discussed with CRNA.        CODE STATUS:

## 2022-07-11 NOTE — ANESTHESIA POSTPROCEDURE EVALUATION
Patient: Shellie Harper    Procedure Summary     Date: 07/11/22 Room / Location: [facility] ENDOSCOPY 1 / [facility] END[facility]COPY    Anesthesia Start: 1104 Anesthesia Stop: 1131    Procedure: COLONOSCOPy with biopsy (N/A ) Diagnosis:       Colon cancer screening      (Colon cancer screening [Z12.11])    Surgeons: Mariely Kaur MD Provider: Bruce Baig MD    Anesthesia Type: general ASA Status: 1          Anesthesia Type: general    Vitals  Vitals Value Taken Time   /72 07/11/22 1146   Temp 36.4 °C (97.5 °F) 07/11/22 1140   Pulse 74 07/11/22 1150   Resp 22 07/11/22 1145   SpO2 99 % 07/11/22 1150   Vitals shown include unvalidated device data.        Post Anesthesia Care and Evaluation    Patient location during evaluation: bedside  Patient participation: complete - patient participated  Level of consciousness: awake  Pain score: 0  Pain management: adequate    Airway patency: patent  Anesthetic complications: No anesthetic complications  PONV Status: none  Cardiovascular status: acceptable and stable  Respiratory status: acceptable and room air  Hydration status: acceptable    Comments: An Anesthesiologist personally participated in the most demanding procedures (including induction and emergence if applicable) in the anesthesia plan, monitored the course of anesthesia administration at frequent intervals and remained physically present and available for immediate diagnosis and treatment of emergencies.

## 2022-07-12 ENCOUNTER — TELEPHONE (OUTPATIENT)
Dept: GASTROENTEROLOGY | Facility: CLINIC | Age: 47
End: 2022-07-12

## 2022-07-12 DIAGNOSIS — K62.9 PERIANAL LESION: Primary | ICD-10-CM

## 2022-07-12 LAB
CYTO UR: NORMAL
LAB AP CASE REPORT: NORMAL
LAB AP CLINICAL INFORMATION: NORMAL
PATH REPORT.FINAL DX SPEC: NORMAL
PATH REPORT.GROSS SPEC: NORMAL

## 2022-07-12 NOTE — TELEPHONE ENCOUNTER
----- Message from ELI Howard sent at 7/12/2022  1:56 PM EDT -----  Please arrange for general surgery appointment regarding perianal lesions.  Please place in recall for repeat colonoscopy in 5 years.  Send letter to patient and PCP.

## 2022-07-14 NOTE — TELEPHONE ENCOUNTER
Spoke to pt and informed of Joycelyn BENITEZ result note and recommendations. Pt verified understanding.     Referral placed for General Surgery.

## 2022-07-25 ENCOUNTER — OFFICE VISIT (OUTPATIENT)
Dept: SURGERY | Facility: CLINIC | Age: 47
End: 2022-07-25

## 2022-07-25 VITALS — RESPIRATION RATE: 16 BRPM | BODY MASS INDEX: 29.51 KG/M2 | HEIGHT: 66 IN | WEIGHT: 183.6 LBS

## 2022-07-25 DIAGNOSIS — A63.0 ANAL CONDYLOMA: Primary | ICD-10-CM

## 2022-07-25 PROCEDURE — 99203 OFFICE O/P NEW LOW 30 MIN: CPT | Performed by: SURGERY

## 2022-07-25 NOTE — PROGRESS NOTES
Chief Complaint:  Hemorrhoids (Perianal lesion)    Primary Care Provider: Aretha Chaudhry APRN    Referring Provider: Radha Henderson A*    History of Present Illness  Shellie Harper is a 46 y.o. female referred by STEWART Howard*after Dr. JOSE Leblanc performed a colonoscopy in 2022 and identified 2 small perianal condylomas.  Patient has some mild itching where the condylomas are located.    Allergies: Sertraline    Outpatient Medications Marked as Taking for the 22 encounter (Office Visit) with Tyson Dickson MD   Medication Sig Dispense Refill   • busPIRone (BUSPAR) 10 MG tablet Take 1 tablet by mouth 2 (Two) Times a Day As Needed.     • cetirizine (zyrTEC) 10 MG tablet Take 10 mg by mouth Daily.     • fenofibrate (TRICOR) 145 MG tablet Take 145 mg by mouth Daily.     • metoprolol succinate XL (TOPROL-XL) 50 MG 24 hr tablet TAKE ONE TABLET BY MOUTH DAILY 90 tablet 3   • Vascepa 1 g capsule capsule Take 2 capsules by mouth 2 (Two) Times a Day.     • venlafaxine XR (EFFEXOR-XR) 37.5 MG 24 hr capsule Daily.     • vitamin D (ERGOCALCIFEROL) 1.25 MG (85729 UT) capsule capsule Every 7 (Seven) Days.         Past Medical History:   • Anxiety   • Hyperlipidemia   • Hypertension   • Mitral valve prolapse   • PONV (postoperative nausea and vomiting)        Past Surgical History:   •  SECTION   • COLONOSCOPY    Procedure: COLONOSCOPy with biopsy;  Surgeon: Mariely Kaur MD;  Location: [facility] ENDOSCOPY;  Service: Gastroenterology;  Laterality: N/A;  colon polyps, hemerrhoids   • LAPAROSCOPIC CHOLECYSTECTOMY   • TUBAL ABDOMINAL LIGATION       Family History:   Family History   Problem Relation Age of Onset   • Diabetes Mother    • Colon cancer Father 74   • Liver cancer Father 74        Social History:  Social History     Tobacco Use   • Smoking status: Never Smoker   • Smokeless tobacco: Never Used   Substance Use Topics   • Alcohol use: Never       Objective     Vital Signs:  " Resp 16   Ht 167.6 cm (66\")   Wt 83.3 kg (183 lb 9.6 oz)   BMI 29.63 kg/m²   • Constitutional:  alert, no acute distress, reliable historian  • HENT:  NCAT, no visible deformities or lesions  • Eyes:  sclerae clear, conjunctivae clear, EOMI  • Neck:  normal appearance, no masses, trachea midline  • Respiratory:  breathing not labored, respiratory effort appears normal  • Cardiovascular:  heart regular rate  • Abdomen:  soft, nontender, nondistended    • Skin and subcutaneous tissue:  no visible concerning rashes or lesions, no jaundice  • Musculoskeletal: moving all extremities symmetrically and purposefully  • Neurologic:  no obvious motor or sensory deficits, normal gait, able to stand without difficulty, cerebellar function without any obvious abnormalities, alert & oriented x 3, speech clear  • Psychiatric:  judgment and insight intact, mood normal, affect appropriate, cooperative  • Anal: Performed with Latanya Boone present in the room as a chaperone.  At the approximately the 3 o'clock position there is an condylomatous skin lesion about 5 mm in size.  At the approximately the 9 o'clock position there is an condylomatous skin lesion about 3 mm in size.     Assessment:  Anal condyloma    Plan:  Excision of anal condylomas.  Will perform as an in-office procedure during a future office visit    Discussion: Indications, options, risks, benefits, and expected outcomes of planned surgery were discussed with the patient and she agrees to proceed.    Tyson Dickson MD  07/25/2022    Electronically signed by Tyson Dickson MD, 07/25/22, 12:37 PM EDT.      "

## 2022-08-22 ENCOUNTER — PROCEDURE VISIT (OUTPATIENT)
Dept: SURGERY | Facility: CLINIC | Age: 47
End: 2022-08-22

## 2022-08-22 VITALS — BODY MASS INDEX: 29.41 KG/M2 | RESPIRATION RATE: 16 BRPM | HEIGHT: 66 IN | WEIGHT: 183 LBS

## 2022-08-22 DIAGNOSIS — A63.0 ANAL CONDYLOMA: Primary | ICD-10-CM

## 2022-08-22 PROCEDURE — 46922 EXCISION OF ANAL LESION(S): CPT | Performed by: SURGERY

## 2022-08-22 PROCEDURE — 88305 TISSUE EXAM BY PATHOLOGIST: CPT | Performed by: SURGERY

## 2022-08-22 NOTE — PROGRESS NOTES
Preoperative diagnosis:  Anal condyloma    Postoperative diagnosis:  Anal condyloma    Procedure:  Excision of anal condylomas     Surgeon:  Tyson Dickson M.D.     Anesthesia:  2 ml lidocaine     Assistant:  Nursing staff     EBL:  Minimal     Complications:  None      Indications:  See my office note dated 7/25/22    Findings:  Condylomatous skin lesion about 5 mm in size removed from the 3-o'clock position and about 3 mm size condylomatous skin lesion removed from the 9 o'clock position.     Technique: Patient was taken to the procedure room and placed appropriately on the procedure table.  Consent had already been obtained.    The perianal skin was cleaned with alcohol.  1 mL of lidocaine was injected into each of the areas of targeted skin lesions.  Scissors were then used to excise 2 condylomatous skin lesions.  1 was located at the 3 o'clock position 1 at the 9 o'clock position.  See above findings section.  The excised tissue was sent to pathology.  Adequate hemostasis.  No complications.     Follow-up:  Wound care instructions were provided verbally.  I will call patient with the pathology result.    Tyson Dickson MD  08/22/2022    Electronically signed by Tyson Dickson MD, 08/22/22, 4:39 PM EDT.

## 2022-11-15 ENCOUNTER — OFFICE VISIT (OUTPATIENT)
Dept: CARDIOLOGY | Facility: CLINIC | Age: 47
End: 2022-11-15

## 2022-11-15 VITALS
HEART RATE: 85 BPM | BODY MASS INDEX: 29.49 KG/M2 | DIASTOLIC BLOOD PRESSURE: 88 MMHG | OXYGEN SATURATION: 98 % | WEIGHT: 183.5 LBS | SYSTOLIC BLOOD PRESSURE: 127 MMHG | HEIGHT: 66 IN

## 2022-11-15 DIAGNOSIS — I10 ESSENTIAL HYPERTENSION: Primary | ICD-10-CM

## 2022-11-15 DIAGNOSIS — E78.1 HYPERTRIGLYCERIDEMIA: ICD-10-CM

## 2022-11-15 DIAGNOSIS — I34.1 MVP (MITRAL VALVE PROLAPSE): ICD-10-CM

## 2022-11-15 PROCEDURE — 99214 OFFICE O/P EST MOD 30 MIN: CPT | Performed by: INTERNAL MEDICINE

## 2022-11-15 RX ORDER — METOPROLOL SUCCINATE 50 MG/1
50 TABLET, EXTENDED RELEASE ORAL DAILY
Qty: 90 TABLET | Refills: 3 | Status: SHIPPED | OUTPATIENT
Start: 2022-11-15

## 2022-11-15 NOTE — ASSESSMENT & PLAN NOTE
Patient's triglycerides in the high 200s improved on high-dose Vascepa and fenofibrate would not recommend further titration at this point did  her on exercise and dietary modification again.

## 2022-11-15 NOTE — PROGRESS NOTES
"Chief Complaint  Hypertension    Subjective    Patient doing well no complaints or issues since being seen last she is exercising with aerobic activity  Past Medical History:   Diagnosis Date   • Anxiety    • Hyperlipidemia    • Hypertension    • Mitral valve prolapse    • PONV (postoperative nausea and vomiting)          Current Outpatient Medications:   •  busPIRone (BUSPAR) 10 MG tablet, Take 1 tablet by mouth 2 (Two) Times a Day As Needed., Disp: , Rfl:   •  cetirizine (zyrTEC) 10 MG tablet, Take 10 mg by mouth Daily., Disp: , Rfl:   •  fenofibrate (TRICOR) 145 MG tablet, Take 145 mg by mouth Daily., Disp: , Rfl:   •  metoprolol succinate XL (TOPROL-XL) 50 MG 24 hr tablet, Take 1 tablet by mouth Daily., Disp: 90 tablet, Rfl: 3  •  Vascepa 1 g capsule capsule, Take 2 capsules by mouth 2 (Two) Times a Day., Disp: , Rfl:   •  venlafaxine XR (EFFEXOR-XR) 37.5 MG 24 hr capsule, Daily., Disp: , Rfl:   •  vitamin D (ERGOCALCIFEROL) 1.25 MG (29915 UT) capsule capsule, Every 7 (Seven) Days., Disp: , Rfl:     Medications Discontinued During This Encounter   Medication Reason   • metoprolol succinate XL (TOPROL-XL) 50 MG 24 hr tablet Reorder     Allergies   Allergen Reactions   • Sertraline Palpitations        Social History     Tobacco Use   • Smoking status: Never   • Smokeless tobacco: Never   Vaping Use   • Vaping Use: Never used   Substance Use Topics   • Alcohol use: Never   • Drug use: Never       Family History   Problem Relation Age of Onset   • Diabetes Mother    • Colon cancer Father 74   • Liver cancer Father 74   • Cancer Father         Objective     /88 (BP Location: Left arm, Patient Position: Sitting, Cuff Size: Adult)   Pulse 85   Ht 167.6 cm (66\")   Wt 83.2 kg (183 lb 8 oz)   SpO2 98%   BMI 29.62 kg/m²       Physical Exam    General Appearance:   · no acute distress  · Alert and oriented x3  HENT:   · lips not cyanotic  · Atraumatic  Neck:  · No jvd   · supple  Respiratory:  · no respiratory " distress  · normal breath sounds  · no rales  Cardiovascular:  · Regular rate and rhythm  · no S3, no S4   · no murmur  · no rub  Extremities  · No cyanosis  · lower extremity edema: none    Skin:   · warm, dry  · No rashes      Result Review :     No results found for: PROBNP  CMP    CMP 3/9/22   Glucose 128 (A)   BUN 13   Creatinine 0.90   Sodium 137   Potassium 4.4   Chloride 100   Calcium 9.6   Albumin 4.50   Total Bilirubin 0.4   Alkaline Phosphatase 54   AST (SGOT) 19   ALT (SGPT) 22   (A) Abnormal value               No results found for: TSH   No results found for: FREET4   No results found for: DDIMERQUANT  Magnesium   Date Value Ref Range Status   09/09/2019 2.10 1.60 - 2.30 mg/dL Final      No results found for: DIGOXIN   Lab Results   Component Value Date    TROPONINT <0.01 09/24/2019           Lipid Panel    Lipid Panel 3/9/22   Total Cholesterol 217 (A)   Triglycerides 216 (A)   HDL Cholesterol 35 (A)   VLDL Cholesterol 39   LDL Cholesterol  143 (A)   LDL/HDL Ratio 3.97   (A) Abnormal value            Lab Results   Component Value Date    POCTROP 0.01 09/09/2019      Ref Range & Units 3 mo ago Resulting Agency   Triglycerides 0 - 149 mg/dL 253 High   [facility]   Comment: (note)   Triglyceride levels (in mg/dL):   Normal (0-9yrs: <75, 10-19yrs: <90, >19yrs: <150),   Borderline (0-9yrs: 75-99, 10-19yrs: , >19yrs: 150-199),   High/very high (0-9yrs: >99, 10-19yrs: >129, >19yrs: >200)   Total Cholesterol 0 - 199 mg/dL 229 High   [facility]   Comment: (note)   Cholesterol levels (in mg/dL):   Desirable <200 adults/<170 children,   Borderline-high: 200-239 adults/170-199 children,   High >239 adults/>199 children.   HDL Cholesterol 60 - 9999 mg/dL 40 Low   [facility]   LDL Cholesterol  0 - 100 mg/dL 138 High   [facility]                       Diagnoses and all orders for this visit:    1. Essential hypertension (Primary)  Assessment & Plan:  Well-controlled continue with Toprol 50 mg  daily      2. Hypertriglyceridemia  Assessment & Plan:  Patient's triglycerides in the high 200s improved on high-dose Vascepa and fenofibrate would not recommend further titration at this point did  her on exercise and dietary modification again.      3. MVP (mitral valve prolapse)    Other orders  -     metoprolol succinate XL (TOPROL-XL) 50 MG 24 hr tablet; Take 1 tablet by mouth Daily.  Dispense: 90 tablet; Refill: 3          Follow Up     Return in about 1 year (around 11/15/2023) for Follow with Gabriela Rome, EKG with F/U.          Patient was given instructions and counseling regarding her condition or for health maintenance advice. Please see specific information pulled into the AVS if appropriate.

## 2023-04-13 NOTE — PROGRESS NOTES
"  HPI:   47 y.o. . Presents for well woman exam. Contraception:  Bilateral salpingectomy  Menses:   q 21 days, lasts 6 days, changes super tampon q 1-2hrs on heaviest days.   Pain:  None  Last pap: abnormal and LSIL, HPV+   Complaints: Pt has no complaints today.    ENDOSCOPY - COLON (2022)   IGP,rfx Aptima HPV All Pth (2022 12:09)       Past Medical History:   Diagnosis Date   • Abnormal Pap smear of cervix    • Anxiety    • HPV (human papilloma virus) infection    • Hyperlipidemia    • Hypertension    • Mitral valve prolapse    • PONV (postoperative nausea and vomiting)       Past Surgical History:   Procedure Laterality Date   •  SECTION     • COLONOSCOPY N/A 2022    Procedure: COLONOSCOPy with biopsy;  Surgeon: Mariely Kaur MD;  Location: [facility] ENDOSCOPY;  Service: Gastroenterology;  Laterality: N/A;  colon polyps, hemerrhoids   • LAPAROSCOPIC CHOLECYSTECTOMY     • TUBAL ABDOMINAL LIGATION        Family History   Problem Relation Age of Onset   • Colon cancer Father 74   • Liver cancer Father 74   • Diabetes Mother      Allergies as of 2023 - Reviewed 2023   Allergen Reaction Noted   • Sertraline Palpitations 2021        PCP: does manage PMHx and preventative labs    /84   Pulse 84   Ht 167.6 cm (66\")   Wt 84.8 kg (187 lb)   LMP 2023 (Approximate)   BMI 30.18 kg/m²     PHYSICAL EXAM: Chaperone present   General- NAD, alert and oriented, appropriate  Psych- Normal mood, good memory  Neck- No masses, no thyroid enlargement  Lymphatic- No palpable neck, axillary, or groin nodes  CV- Regular rhythm, no murmurs  Resp- CTA to bases, no wheezes  Abdomen- Soft, non distended, non tender, no masses  Breast left-  Bilaterally symmetrical, no masses, non tender, no nipple discharge  Breast right- Bilaterally symmetrical, no masses, non tender, no nipple discharge  External genitalia- Normal female, no lesions  Urethra/meatus- Normal, no masses, " non tender, no prolapse  Bladder- Normal, no masses, non tender, no prolapse  Vagina- Normal, no atrophy, no lesions, no discharge, no prolapse  Cvx- Normal, no lesions, no discharge, No cervical motion tenderness  Uterus- Normal size, shape & consistency.  Non tender, mobile, & no prolapse  Adnexa- No mass, non tender  Anus/Rectum/Perineum- Not performed  Ext- No edema, no cyanosis    Skin- No lesions, no rashes, no acanthosis nigricans       ASSESSMENT and PLAN:    Diagnoses and all orders for this visit:    1. Well woman exam (Primary)  -     IgP, Aptima HPV    2. Encounter for screening mammogram for malignant neoplasm of breast  -     Mammo Screening Digital Tomosynthesis Bilateral With CAD; Future    3. Screen for STD (sexually transmitted disease)  -     RPR, Rfx Qn RPR / Confirm TP  -     Hepatitis B Surface Antigen  -     Hepatitis C Antibody  -     HIV-1 / O / 2 Ag / Antibody 4th Generation  -     Chlamydia trachomatis, Neisseria gonorrhoeae, PCR - Swab, Cervix      Preventative:   BREAST HEALTH- Breast awareness, self breast exam, MMG and/or MRI reviewed per society guidelines and her individual history. Screen: Updated today  CERVICAL CANCER Screening- Reviewed current ASCCP guidelines for screening w and wo cotest HPV, age specific.  Screen: Updated today  COLON CANCER Screening- Reviewed current medical society guidelines and options.  Screen:  Already up to date  SEXUAL HEALTH: STD screening recommended.  Ordered,  Safe sex and condoms  BONE HEALTH- Reviewed current medical society guidelines and options for testing, calcium and vit D intake.  Weight bearing exercise.  DEXA: Not medically needed  VACCINATIONS Recommended: Up to date.  Importance discussed, risk being unvaccinated reviewed.  Questions answered  Smoking status- NON SMOKER  Follow up PCP/Specialist PMHx and Labs  Myriad: Does not qualify.  TRACK MENSES, RTO if <q21d, >7d long, heavy or painful.    SAFE SEX/condoms importance reviewed.       Follow Up:  Return in about 1 year (around 4/18/2024).        Sia Yun, APRN  04/18/2023

## 2023-04-18 ENCOUNTER — OFFICE VISIT (OUTPATIENT)
Dept: OBSTETRICS AND GYNECOLOGY | Facility: CLINIC | Age: 48
End: 2023-04-18
Payer: COMMERCIAL

## 2023-04-18 VITALS
WEIGHT: 187 LBS | DIASTOLIC BLOOD PRESSURE: 84 MMHG | SYSTOLIC BLOOD PRESSURE: 122 MMHG | HEIGHT: 66 IN | HEART RATE: 84 BPM | BODY MASS INDEX: 30.05 KG/M2

## 2023-04-18 DIAGNOSIS — Z11.3 SCREEN FOR STD (SEXUALLY TRANSMITTED DISEASE): ICD-10-CM

## 2023-04-18 DIAGNOSIS — Z12.31 ENCOUNTER FOR SCREENING MAMMOGRAM FOR MALIGNANT NEOPLASM OF BREAST: ICD-10-CM

## 2023-04-18 DIAGNOSIS — Z01.419 WELL WOMAN EXAM: Primary | ICD-10-CM

## 2023-04-18 LAB
C TRACH RRNA CVX QL NAA+PROBE: NOT DETECTED
HBV SURFACE AG SERPL QL IA: NORMAL
HIV1+2 AB SER QL: NORMAL
N GONORRHOEA RRNA SPEC QL NAA+PROBE: NOT DETECTED

## 2023-04-18 PROCEDURE — 86592 SYPHILIS TEST NON-TREP QUAL: CPT | Performed by: NURSE PRACTITIONER

## 2023-04-18 PROCEDURE — G0123 SCREEN CERV/VAG THIN LAYER: HCPCS | Performed by: NURSE PRACTITIONER

## 2023-04-18 PROCEDURE — 87591 N.GONORRHOEAE DNA AMP PROB: CPT | Performed by: NURSE PRACTITIONER

## 2023-04-18 PROCEDURE — G0432 EIA HIV-1/HIV-2 SCREEN: HCPCS | Performed by: NURSE PRACTITIONER

## 2023-04-18 PROCEDURE — 86803 HEPATITIS C AB TEST: CPT | Performed by: NURSE PRACTITIONER

## 2023-04-18 PROCEDURE — 87491 CHLMYD TRACH DNA AMP PROBE: CPT | Performed by: NURSE PRACTITIONER

## 2023-04-18 PROCEDURE — 87624 HPV HI-RISK TYP POOLED RSLT: CPT | Performed by: NURSE PRACTITIONER

## 2023-04-18 PROCEDURE — 87340 HEPATITIS B SURFACE AG IA: CPT | Performed by: NURSE PRACTITIONER

## 2023-04-19 LAB — HCV AB SER DONR QL: NORMAL

## 2023-04-20 LAB — RPR SER QL: NON REACTIVE

## 2023-04-25 LAB
CYTOLOGIST CVX/VAG CYTO: ABNORMAL
CYTOLOGY CVX/VAG DOC CYTO: ABNORMAL
CYTOLOGY CVX/VAG DOC THIN PREP: ABNORMAL
DX ICD CODE: ABNORMAL
DX ICD CODE: ABNORMAL
HIV 1 & 2 AB SER-IMP: ABNORMAL
HPV I/H RISK 4 DNA CVX QL PROBE+SIG AMP: POSITIVE
OTHER STN SPEC: ABNORMAL
PATHOLOGIST CVX/VAG CYTO: ABNORMAL
RECOM F/U CVX/VAG CYTO: ABNORMAL
STAT OF ADQ CVX/VAG CYTO-IMP: ABNORMAL

## 2023-04-26 ENCOUNTER — TELEPHONE (OUTPATIENT)
Dept: OBSTETRICS AND GYNECOLOGY | Facility: CLINIC | Age: 48
End: 2023-04-26
Payer: COMMERCIAL

## 2023-04-26 NOTE — TELEPHONE ENCOUNTER
----- Message from ELI Ernandez sent at 4/26/2023  6:17 AM EDT -----  Persistent pap abnormality s/p colposcopy 11-- no dysplasia  Current Pap ASCUS, HPV+, Endometrial cells present.   2022- LSIL, HPV+    Recommend repeat colposcopy, +/- endometrial biopsy at MD discretion- for further evaluation of the persistent abnormal pap and presence of endometrial cells.

## 2023-05-01 ENCOUNTER — TELEPHONE (OUTPATIENT)
Dept: OBSTETRICS AND GYNECOLOGY | Facility: CLINIC | Age: 48
End: 2023-05-01
Payer: COMMERCIAL

## 2023-05-01 NOTE — TELEPHONE ENCOUNTER
UNABLE TO CONTACT / TRIED 3 TIMES 4/26/23, 4/27/23 & 4/28/23 ( SEE REFERRAL ) / LETTER MAILED TO ADDRESS ON FILE / REFERRAL TO MAMMO CLOSED

## 2023-05-02 NOTE — TELEPHONE ENCOUNTER
Tried to call the patient to discuss her pap results. She did not answer and voicemail was full. Letter sent to the patient to contact the office to discuss.

## 2023-05-31 NOTE — PROGRESS NOTES
Colposcopy    CC:  Pt presents for colposcopy  Chief Complaint   Patient presents with    Procedure       Tobacco/Nicotine use:  no  Pap result: ASCUS HPV POSITIVE  cg:  Not done. Patient had Bilateral Salpingectomy  Consent signed: yes    Procedure reviewed in detail.  She understands the potential risks include, but are not limited to, pain, bleeding, and infection.  Her questions have been answered.    Subjective/HPI:  47 y.o.   Social History     Substance and Sexual Activity   Sexual Activity Not Currently    Partners: Male    Birth control/protection: Surgical, Tubal ligation    Comment: salpingectomy       48 y/o  s/p salpingectomy LMP 23 with ASCUS HPV+ here for colpo.  Pt with hx abnormal pap in past     Objective:  /86   Pulse 114   Wt 84.4 kg (186 lb)   LMP 2023 (Exact Date)   Breastfeeding No   BMI 30.02 kg/m²   Perineum- Without lesion  Vagina- Without lesion   Cervix- Adequate 100%TZ seen, AWL, MZ, Biopsies taken at lesion site/s 9, ECC performed, Monsels for hemostasis, Hemostatic  Physical Exam  Patient tolerated the procedure well.    Assessment and Plan:  Diagnoses and all orders for this visit:    1. ASCUS with positive high risk HPV cervical (Primary)  -     Tissue Pathology Exam        Counseling:    We discussed her Pap diagnosis and HPV in detail.  HPV incidence, transmission, course, remission, progression, types, cervical cancer, genital warts, monitoring, and importance of compliance were reviewed.      She understands the need for continued follow up until she is cleared to return to routine screening pap smears.  She understands the importance of follow up and consequences with lack of follow up (i.e. potential for cervical cancer).  Follow up usually ranges every 12 months, rarely sooner.      PRECAUTIONS - It is common to have bright red spotting and dark discharge after today.  If she has had cervical biopsies, she is to avoid intercourse or tampons  as directed for 7 days.  She can use OTC pain relievers prn.  She needs to return to office or ER (if after hours/weekends) if she has pelvic pain, bleeding > 1 pad/2 hours, discharge that has a bad vaginal odor or vaginal itching, fever > 101.5, or any other concerns.        Follow Up:  No follow-ups on file.      Thais Alegria DO  06/01/2023    [facility]  [facility] [place] [address]  Dept: [phone]  Dept Fax: [phone]  Loc: [phone]  Loc Fax: [phone]

## 2023-06-01 ENCOUNTER — PROCEDURE VISIT (OUTPATIENT)
Dept: OBSTETRICS AND GYNECOLOGY | Facility: CLINIC | Age: 48
End: 2023-06-01

## 2023-06-01 VITALS
HEART RATE: 114 BPM | WEIGHT: 186 LBS | SYSTOLIC BLOOD PRESSURE: 134 MMHG | DIASTOLIC BLOOD PRESSURE: 86 MMHG | BODY MASS INDEX: 30.02 KG/M2

## 2023-06-01 DIAGNOSIS — R87.810 ASCUS WITH POSITIVE HIGH RISK HPV CERVICAL: Primary | ICD-10-CM

## 2023-06-01 DIAGNOSIS — R87.610 ASCUS WITH POSITIVE HIGH RISK HPV CERVICAL: Primary | ICD-10-CM

## 2023-06-01 PROCEDURE — 88305 TISSUE EXAM BY PATHOLOGIST: CPT | Performed by: OBSTETRICS & GYNECOLOGY

## 2023-06-07 ENCOUNTER — TELEPHONE (OUTPATIENT)
Dept: OBSTETRICS AND GYNECOLOGY | Facility: CLINIC | Age: 48
End: 2023-06-07
Payer: COMMERCIAL

## 2023-06-07 NOTE — TELEPHONE ENCOUNTER
Patient informed of her results. She understands to repeat in 12 months and has an appointment scheduled.

## 2023-06-07 NOTE — TELEPHONE ENCOUNTER
----- Message from Thais Alegria DO sent at 6/5/2023 11:31 AM EDT -----  Notify patient biopsy mild dysplasia.  Let's do pap 1 year with HPV

## 2023-11-15 ENCOUNTER — OFFICE VISIT (OUTPATIENT)
Dept: CARDIOLOGY | Facility: CLINIC | Age: 48
End: 2023-11-15
Payer: COMMERCIAL

## 2023-11-15 VITALS
HEART RATE: 76 BPM | WEIGHT: 182 LBS | SYSTOLIC BLOOD PRESSURE: 125 MMHG | DIASTOLIC BLOOD PRESSURE: 87 MMHG | HEIGHT: 66 IN | BODY MASS INDEX: 29.25 KG/M2

## 2023-11-15 DIAGNOSIS — E78.1 HYPERTRIGLYCERIDEMIA: ICD-10-CM

## 2023-11-15 DIAGNOSIS — I10 ESSENTIAL HYPERTENSION: ICD-10-CM

## 2023-11-15 DIAGNOSIS — I34.1 MVP (MITRAL VALVE PROLAPSE): Primary | ICD-10-CM

## 2023-11-15 PROBLEM — F32.A DEPRESSION: Status: ACTIVE | Noted: 2021-02-23

## 2023-11-15 PROBLEM — F41.9 ANXIETY: Status: ACTIVE | Noted: 2021-02-23

## 2023-11-15 PROBLEM — E55.9 VITAMIN D DEFICIENCY: Status: ACTIVE | Noted: 2021-02-23

## 2023-11-15 NOTE — PROGRESS NOTES
Chief Complaint  Hypertension, Mitral Valve Prolapse, and Follow-up    Subjective            History of Present Illness  Shellie Harper is a 48-year-old white/ female patient who presents to the office today for follow-up.  She has mitral valve prolapse, hyperlipidemia, and hypertension.  She is compliant with medication.  She denies any chest pain, shortness of breath, lightheadedness/dizziness, palpitations, or edema.    PMH  Past Medical History:   Diagnosis Date    Abnormal Pap smear of cervix     Anxiety     HPV (human papilloma virus) infection     Hyperlipidemia     Hypertension     Mitral valve prolapse     PONV (postoperative nausea and vomiting)          ALLERGY  Allergies   Allergen Reactions    Sertraline Palpitations          SURGICALHX  Past Surgical History:   Procedure Laterality Date     SECTION      COLONOSCOPY N/A 2022    Procedure: COLONOSCOPy with biopsy;  Surgeon: Mariely Kaur MD;  Location: [facility] ENDOSCOPY;  Service: Gastroenterology;  Laterality: N/A;  colon polyps, hemerrhoids    LAPAROSCOPIC CHOLECYSTECTOMY      TUBAL ABDOMINAL LIGATION            SOC  Social History     Socioeconomic History    Marital status:     Number of children: 1   Tobacco Use    Smoking status: Never    Smokeless tobacco: Never   Vaping Use    Vaping Use: Never used   Substance and Sexual Activity    Alcohol use: Never    Drug use: Never    Sexual activity: Not Currently     Partners: Male     Birth control/protection: Surgical, Tubal ligation     Comment: salpingectomy         FAMHX  Family History   Problem Relation Age of Onset    Colon cancer Father 74    Liver cancer Father 74    Hypertension Father     Diabetes Mother           MEDSIGONLY  Current Outpatient Medications on File Prior to Visit   Medication Sig    busPIRone (BUSPAR) 10 MG tablet Take 1 tablet by mouth 2 (Two) Times a Day As Needed.    cetirizine (zyrTEC) 10 MG tablet Take 1 tablet by mouth Daily.     "fenofibrate (TRICOR) 145 MG tablet Take 1 tablet by mouth Daily.    metoprolol succinate XL (TOPROL-XL) 50 MG 24 hr tablet Take 1 tablet by mouth Daily.    Vascepa 1 g capsule capsule Take 2 g by mouth 2 (Two) Times a Day.    venlafaxine XR (EFFEXOR-XR) 37.5 MG 24 hr capsule Daily.    vitamin D (ERGOCALCIFEROL) 1.25 MG (88036 UT) capsule capsule Every 7 (Seven) Days.     No current facility-administered medications on file prior to visit.         Objective   /87 (BP Location: Left arm)   Pulse 76   Ht 167.6 cm (66\")   Wt 82.6 kg (182 lb)   BMI 29.38 kg/m²       Physical Exam  HENT:      Head: Normocephalic.   Neck:      Vascular: No carotid bruit.   Cardiovascular:      Rate and Rhythm: Normal rate and regular rhythm.      Pulses: Normal pulses.      Heart sounds: Murmur heard.   Pulmonary:      Effort: Pulmonary effort is normal.      Breath sounds: Normal breath sounds.   Musculoskeletal:      Cervical back: Neck supple.      Right lower leg: No edema.      Left lower leg: No edema.   Skin:     General: Skin is dry.   Neurological:      Mental Status: She is alert and oriented to person, place, and time.   Psychiatric:         Behavior: Behavior normal.       ECG 12 Lead    Date/Time: 11/15/2023 9:15 AM  Performed by: aGbriela Rome APRN    Authorized by: Gabriela Rome APRN  Comparison: compared with previous ECG from 9/9/2019  Similar to previous ECG  Rhythm: sinus rhythm  BPM: 71  Conduction: conduction normal  ST Segments: ST segments normal  T Waves: T waves normal  QRS axis: left  Other findings: poor R wave progression    Clinical impression: normal ECG        Result Review :   The following data was reviewed by: ELI Kramer on 11/15/2023:  No results found for: \"PROBNP\"    CBC w/diff          2/9/2023    07:31   CBC w/Diff   WBC 5.92       RBC 4.20       Hemoglobin 12.8       Hematocrit 37.7       MCV 89.8       MCH 30.5       MCHC 34.0       RDW 12.5       Platelets 358   " "    Neutrophil Rel % 47.1       Immature Granulocyte Rel % 0.3       Lymphocyte Rel % 39.0       Monocyte Rel % 8.1       Eosinophil Rel % 4.7       Basophil Rel % 0.8         No results found for: \"TSH\"   No results found for: \"FREET4\"   No results found for: \"DDIMERQUANT\"  Magnesium   Date Value Ref Range Status   09/09/2019 2.10 1.60 - 2.30 mg/dL Final      No results found for: \"DIGOXIN\"   Lab Results   Component Value Date    TROPONINT <0.01 09/24/2019                Assessment and Plan    Diagnoses and all orders for this visit:    1. MVP (mitral valve prolapse) (Primary)  Asymptomatic, continue to monitor with repeat echocardiogram  -     Adult Transthoracic Echo Complete W/ Cont if Necessary Per Protocol; Future    2. Hypertriglyceridemia  Last lipid panel was 7/17/2023 with elevated lipid levels.  She is going to do better about taking the fenofibrate and Vascepa.  She is due to have a 3 checked with PCP, if remains elevated would recommend adding statin therapy.    3. Essential hypertension  Currently controlled and without adverse effects from medication, continue metoprolol 50 mg daily.    Other orders  -     ECG 12 Lead            Follow Up   Return in about 1 year (around 11/15/2024) for Follow up with Dr Lozano.    Patient was given instructions and counseling regarding her condition or for health maintenance advice. Please see specific information pulled into the AVS if appropriate.     Shellie JOSE Harper  reports that she has never smoked. She has never used smokeless tobacco.         Gabriela Rome, APRN  11/15/23  08:27 EST    Dictated Utilizing Dragon Dictation    "

## 2023-11-29 RX ORDER — METOPROLOL SUCCINATE 50 MG/1
50 TABLET, EXTENDED RELEASE ORAL DAILY
Qty: 90 TABLET | Refills: 3 | Status: SHIPPED | OUTPATIENT
Start: 2023-11-29

## 2024-03-26 ENCOUNTER — HOSPITAL ENCOUNTER (OUTPATIENT)
Dept: CARDIOLOGY | Facility: HOSPITAL | Age: 49
Discharge: HOME | End: 2024-03-26
Admitting: NURSE PRACTITIONER
Payer: COMMERCIAL

## 2024-03-26 DIAGNOSIS — I34.1 MVP (MITRAL VALVE PROLAPSE): ICD-10-CM

## 2024-03-26 PROCEDURE — 93306 TTE W/DOPPLER COMPLETE: CPT

## 2024-03-28 ENCOUNTER — TELEPHONE (OUTPATIENT)
Dept: CARDIOLOGY | Facility: CLINIC | Age: 49
End: 2024-03-28
Payer: COMMERCIAL

## 2024-03-28 LAB
BH CV ECHO MEAS - AO ROOT DIAM: 2.5 CM
BH CV ECHO MEAS - EDV(CUBED): 114.8 ML
BH CV ECHO MEAS - EDV(MOD-SP2): 46.2 ML
BH CV ECHO MEAS - EDV(MOD-SP4): 66.4 ML
BH CV ECHO MEAS - EF(MOD-BP): 66 %
BH CV ECHO MEAS - EF(MOD-SP2): 52.6 %
BH CV ECHO MEAS - EF(MOD-SP4): 76.1 %
BH CV ECHO MEAS - ESV(CUBED): 39.3 ML
BH CV ECHO MEAS - ESV(MOD-SP2): 21.9 ML
BH CV ECHO MEAS - ESV(MOD-SP4): 15.9 ML
BH CV ECHO MEAS - FS: 30 %
BH CV ECHO MEAS - IVS/LVPW: 0.88 CM
BH CV ECHO MEAS - IVSD: 0.98 CM
BH CV ECHO MEAS - LA DIMENSION: 3.5 CM
BH CV ECHO MEAS - LAT PEAK E' VEL: 6 CM/SEC
BH CV ECHO MEAS - LV DIASTOLIC VOL/BSA (35-75): 34.2 CM2
BH CV ECHO MEAS - LV MASS(C)D: 185.6 GRAMS
BH CV ECHO MEAS - LV SYSTOLIC VOL/BSA (12-30): 8.2 CM2
BH CV ECHO MEAS - LVIDD: 4.9 CM
BH CV ECHO MEAS - LVIDS: 3.4 CM
BH CV ECHO MEAS - LVPWD: 1.12 CM
BH CV ECHO MEAS - MED PEAK E' VEL: 10.7 CM/SEC
BH CV ECHO MEAS - MV A MAX VEL: 84.8 CM/SEC
BH CV ECHO MEAS - MV DEC SLOPE: 403 CM/SEC2
BH CV ECHO MEAS - MV DEC TIME: 0.17 SEC
BH CV ECHO MEAS - MV E MAX VEL: 68.6 CM/SEC
BH CV ECHO MEAS - MV E/A: 0.81
BH CV ECHO MEAS - RVDD: 2.31 CM
BH CV ECHO MEAS - SI(MOD-SP2): 12.5 ML/M2
BH CV ECHO MEAS - SI(MOD-SP4): 26 ML/M2
BH CV ECHO MEAS - SV(MOD-SP2): 24.3 ML
BH CV ECHO MEAS - SV(MOD-SP4): 50.5 ML
BH CV ECHO MEASUREMENTS AVERAGE E/E' RATIO: 8.22
LEFT ATRIUM VOLUME INDEX: 11.2 ML/M2

## 2024-03-28 NOTE — TELEPHONE ENCOUNTER
----- Message from ELI Hidalgo sent at 3/28/2024 10:53 AM EDT -----  Echocardiogram shows normal heart muscle function with no significant valvular abnormalities.  The mitral valve is grossly normal in structure. Follow-up as scheduled

## 2024-04-17 NOTE — PROGRESS NOTES
"Chief Complaint   Patient presents with    Gynecologic Exam       HPI:   48 y.o. . Presents for well woman exam. Contraception:  Tubal ligation  Menses:   q month, lasts 5 days, changes super tampon q 2 hrs on heaviest days.   Pain:  Mild, OTC meds control discomfort  Last pap: abnormal IgP, Aptima HPV (2023 15:36), ASCUS, HPV+ Colpo, CIN1  Complaints: Pt has no complaints today.    Patient reports that she is not currently experiencing any symptoms of urinary incontinence.       Past Medical History:   Diagnosis Date    Abnormal Pap smear of cervix     Anxiety     Atyp squam cell of undet signfc cyto smr crvx (ASC-US)     Depression     HPV (human papilloma virus) infection     Hyperlipidemia     Hypertension     Mitral valve prolapse     PONV (postoperative nausea and vomiting)       Past Surgical History:   Procedure Laterality Date     SECTION      COLONOSCOPY N/A 2022    Procedure: COLONOSCOPy with biopsy;  Surgeon: Mariely Kaur MD;  Location: [facility] END[facility]COPY;  Service: Gastroenterology;  Laterality: N/A;  colon polyps, hemerrhoids    LAPAROSCOPIC CHOLECYSTECTOMY      TUBAL ABDOMINAL LIGATION        Family History   Problem Relation Age of Onset    Colon cancer Father 74    Liver cancer Father 74    Hypertension Father     Diabetes Mother     Breast cancer Neg Hx     Uterine cancer Neg Hx     Ovarian cancer Neg Hx      Allergies as of 2024 - Reviewed 2024   Allergen Reaction Noted    Sertraline Palpitations 2021        PCP: does manage PMHx and preventative labs    /89   Pulse 83   Ht 167.6 cm (66\")   Wt 82.6 kg (182 lb)   LMP 2024 (Exact Date)   BMI 29.38 kg/m²     PHYSICAL EXAM: Chaperone present   General- NAD, alert and oriented, appropriate  Psych- Normal mood, good memory  Neck- No masses, no thyroid enlargement  Lymphatic- No palpable neck, axillary, or groin nodes  CV- Regular rhythm, no murmurs  Resp- CTA to bases, no " wheezes  Abdomen- Soft, non distended, non tender, no masses  Breast left-  Bilaterally symmetrical, no masses, non tender, no nipple discharge  Breast right- Bilaterally symmetrical, no masses, non tender, no nipple discharge  External genitalia- Normal female, no lesions  Urethra/meatus- Normal, no masses, non tender, no prolapse  Bladder- Normal, no masses, non tender, no prolapse  Vagina- Normal, no atrophy, no lesions, no discharge, no prolapse  Cvx- Normal, no lesions, no discharge, No cervical motion tenderness  Uterus- Normal size, shape & consistency.  Non tender, mobile.  Adnexa- No mass, non tender  Anus/Rectum/Perineum- Not performed  Ext- No edema, no cyanosis    Skin- No lesions, no rashes, no acanthosis nigricans    ASSESSMENT and PLAN:    Diagnoses and all orders for this visit:    1. Well woman exam (Primary)  -     IgP, Aptima HPV    2. Encounter for screening mammogram for malignant neoplasm of breast  -     Mammo Screening Digital Tomosynthesis Bilateral With CAD; Future      Preventative:   BREAST HEALTH- Monthly self breast exam importance and how to reviewed. MMG and/or MRI (prn) reviewed per society guidelines and her individual history. Screening Imaging: Updated today  CERVICAL CANCER Screening- Reviewed current ASCCP guidelines for screening w and wo cotest HPV, age specific.  Screen: Updated today  COLON CANCER Screening- Reviewed current medical society guidelines and options.  Screen:  Already up to date  SEXUAL HEALTH: Declines STD screening  BONE HEALTH- Reviewed current medical society guidelines and options for testing, calcium and vit D intake.  Weight bearing exercise.  DEXA: Not medically needed  VACCINATIONS Recommended: Up to date  Importance discussed, risk being unvaccinated reviewed.  Questions answered  Genetic testing: Does not qualify.  Smoking status- NON SMOKER  Follow up PCP/Specialist PMHx and Labs     She understands the importance of having any ordered tests to be  performed in a timely fashion.  The risks of not performing them include, but are not limited to, advanced cancer stages, bone loss from osteoporosis and/or subsequent increase in morbidity and/or mortality.  She is encouraged to review her results online and/or contact or office if she has questions.       Follow Up:  Return in about 1 year (around 4/19/2025).        Sia Yun, APRN  04/19/2024

## 2024-04-19 ENCOUNTER — OFFICE VISIT (OUTPATIENT)
Dept: OBSTETRICS AND GYNECOLOGY | Facility: CLINIC | Age: 49
End: 2024-04-19
Payer: COMMERCIAL

## 2024-04-19 VITALS
DIASTOLIC BLOOD PRESSURE: 89 MMHG | BODY MASS INDEX: 29.25 KG/M2 | SYSTOLIC BLOOD PRESSURE: 130 MMHG | WEIGHT: 182 LBS | HEART RATE: 83 BPM | HEIGHT: 66 IN

## 2024-04-19 DIAGNOSIS — Z01.419 WELL WOMAN EXAM: Primary | ICD-10-CM

## 2024-04-19 DIAGNOSIS — Z12.31 ENCOUNTER FOR SCREENING MAMMOGRAM FOR MALIGNANT NEOPLASM OF BREAST: ICD-10-CM

## 2024-04-19 PROCEDURE — 87624 HPV HI-RISK TYP POOLED RSLT: CPT | Performed by: NURSE PRACTITIONER

## 2024-04-19 PROCEDURE — G0123 SCREEN CERV/VAG THIN LAYER: HCPCS | Performed by: NURSE PRACTITIONER

## 2024-04-25 LAB
CYTOLOGIST CVX/VAG CYTO: ABNORMAL
CYTOLOGY CVX/VAG DOC CYTO: ABNORMAL
CYTOLOGY CVX/VAG DOC THIN PREP: ABNORMAL
DX ICD CODE: ABNORMAL
DX ICD CODE: ABNORMAL
HPV I/H RISK 4 DNA CVX QL PROBE+SIG AMP: POSITIVE
Lab: ABNORMAL
OTHER STN SPEC: ABNORMAL
PATHOLOGIST CVX/VAG CYTO: ABNORMAL
RECOM F/U CVX/VAG CYTO: ABNORMAL
STAT OF ADQ CVX/VAG CYTO-IMP: ABNORMAL

## 2024-04-26 ENCOUNTER — TELEPHONE (OUTPATIENT)
Dept: OBSTETRICS AND GYNECOLOGY | Facility: CLINIC | Age: 49
End: 2024-04-26
Payer: COMMERCIAL

## 2024-04-26 NOTE — TELEPHONE ENCOUNTER
UNABLE TO CONTACT / TRIED 3 TIMES 4/22/24, 4/23/24 & 4/24/24 ( SEE REFERRAL ) / LETTER MAILED TO ADDRESS ON FILE / REFERRAL TO MAMMO CLOSED

## 2024-11-20 ENCOUNTER — OFFICE VISIT (OUTPATIENT)
Dept: CARDIOLOGY | Facility: CLINIC | Age: 49
End: 2024-11-20
Payer: COMMERCIAL

## 2024-11-20 VITALS
HEART RATE: 84 BPM | DIASTOLIC BLOOD PRESSURE: 86 MMHG | BODY MASS INDEX: 28.93 KG/M2 | SYSTOLIC BLOOD PRESSURE: 121 MMHG | HEIGHT: 66 IN | WEIGHT: 180 LBS

## 2024-11-20 DIAGNOSIS — I34.1 MVP (MITRAL VALVE PROLAPSE): Primary | ICD-10-CM

## 2024-11-20 DIAGNOSIS — I10 ESSENTIAL HYPERTENSION: ICD-10-CM

## 2024-11-20 PROCEDURE — 99213 OFFICE O/P EST LOW 20 MIN: CPT | Performed by: INTERNAL MEDICINE

## 2024-11-20 RX ORDER — ROSUVASTATIN CALCIUM 5 MG/1
5 TABLET, COATED ORAL DAILY
COMMUNITY
Start: 2024-06-19

## 2024-11-20 RX ORDER — METOPROLOL SUCCINATE 50 MG/1
50 TABLET, EXTENDED RELEASE ORAL DAILY
Qty: 90 TABLET | Refills: 3 | Status: SHIPPED | OUTPATIENT
Start: 2024-11-20

## 2024-11-20 NOTE — PROGRESS NOTES
"Chief Complaint  Follow-up, Hypertension, and Mitral Valve Prolapse    Subjective    Patient stable symptomatically no change in breathing ability no symptomatic tachycardia  Past Medical History:   Diagnosis Date    Abnormal Pap smear of cervix     Anxiety     Atyp squam cell of undet signfc cyto smr crvx (ASC-US)     Depression     HPV (human papilloma virus) infection     Hyperlipidemia     Hypertension     Mitral valve prolapse     PONV (postoperative nausea and vomiting)          Current Outpatient Medications:     busPIRone (BUSPAR) 10 MG tablet, Take 1 tablet by mouth 2 (Two) Times a Day As Needed., Disp: , Rfl:     cetirizine (zyrTEC) 10 MG tablet, Take 1 tablet by mouth Daily., Disp: , Rfl:     fenofibrate (TRICOR) 145 MG tablet, Take 1 tablet by mouth Daily., Disp: , Rfl:     metoprolol succinate XL (TOPROL-XL) 50 MG 24 hr tablet, TAKE ONE TABLET BY MOUTH DAILY, Disp: 90 tablet, Rfl: 3    rosuvastatin (CRESTOR) 5 MG tablet, Take 1 tablet by mouth Daily., Disp: , Rfl:     Vascepa 1 g capsule capsule, Take 2 g by mouth 2 (Two) Times a Day., Disp: , Rfl:     venlafaxine XR (EFFEXOR-XR) 37.5 MG 24 hr capsule, Daily., Disp: , Rfl:     vitamin D (ERGOCALCIFEROL) 1.25 MG (73266 UT) capsule capsule, Every 7 (Seven) Days., Disp: , Rfl:     There are no discontinued medications.  Allergies   Allergen Reactions    Sertraline Palpitations        Social History     Tobacco Use    Smoking status: Never    Smokeless tobacco: Never   Vaping Use    Vaping status: Never Used   Substance Use Topics    Alcohol use: Never    Drug use: Never       Family History   Problem Relation Age of Onset    Colon cancer Father 74    Liver cancer Father 74    Hypertension Father     Diabetes Mother     Breast cancer Neg Hx     Uterine cancer Neg Hx     Ovarian cancer Neg Hx         Objective     /86   Pulse 84   Ht 167.6 cm (66\")   Wt 81.6 kg (180 lb)   BMI 29.05 kg/m²       Physical Exam    General Appearance:   no acute " "distress  Alert and oriented x3  HENT:   lips not cyanotic  Atraumatic  Neck:  No jvd   supple  Respiratory:  no respiratory distress  normal breath sounds  no rales  Cardiovascular:  Regular rate and rhythm  no S3, no S4   no murmur  no rub  Extremities  No cyanosis  lower extremity edema: none    Skin:   warm, dry  No rashes      Result Review :     No results found for: \"PROBNP\"    CBC w/diff          6/18/2024    09:34   CBC w/Diff   WBC 6.92       RBC 4.32       Hemoglobin 13.1       Hematocrit 39.0       MCV 90.3       MCH 30.3       MCHC 33.6       RDW 12.4       Platelets 312       Neutrophil Rel % 42.9       Immature Granulocyte Rel % 0.6       Lymphocyte Rel % 41.9       Monocyte Rel % 8.7       Eosinophil Rel % 4.9       Basophil Rel % 1.0          Details          This result is from an external source.              No results found for: \"TSH\"   No results found for: \"FREET4\"   No results found for: \"DDIMERQUANT\"  Magnesium   Date Value Ref Range Status   09/09/2019 2.10 1.60 - 2.30 mg/dL Final      No results found for: \"DIGOXIN\"   Lab Results   Component Value Date    TROPONINT <0.01 09/24/2019             Lab Results   Component Value Date    POCTROP 0.01 09/09/2019       Results for orders placed during the hospital encounter of 03/26/24    Adult Transthoracic Echo Complete W/ Cont if Necessary Per Protocol    Interpretation Summary    Left ventricular systolic function is normal. Calculated left ventricular EF = 66%    Left ventricular diastolic function was normal.                 Diagnoses and all orders for this visit:    1. MVP (mitral valve prolapse) (Primary)  Assessment & Plan:  No severe regurgitation seen and no murmur on exam      2. Essential hypertension  Assessment & Plan:  Blood pressure control continue with Toprol 50 mg daily               Follow Up     Return in about 1 year (around 11/20/2025) for Follow with Gabriela Rome, EKG with F/U.          Patient was given instructions and " counseling regarding her condition or for health maintenance advice. Please see specific information pulled into the AVS if appropriate.

## 2024-11-28 DIAGNOSIS — I34.1 MVP (MITRAL VALVE PROLAPSE): ICD-10-CM

## 2024-12-02 RX ORDER — METOPROLOL SUCCINATE 50 MG/1
50 TABLET, EXTENDED RELEASE ORAL DAILY
Qty: 90 TABLET | Refills: 3 | OUTPATIENT
Start: 2024-12-02

## 2025-04-01 NOTE — PROGRESS NOTES
Chief Complaint   Patient presents with    Annual Exam       HPI:   49 y.o. . Presents for well woman exam. Contraception:  Tubal ligation  Menses:   q month, lasts 6-7 days, changes super tampon q 2 hrs on heaviest days.   Pain:  None  Last pap: abnormal IgP, Aptima HPV (2024 09:35)  Complaints: Pt has no complaints today.  Patient reports that she is not currently experiencing any symptoms of urinary incontinence.       Past Medical History:   Diagnosis Date    Abnormal Pap smear of cervix     Anxiety     Atyp squam cell of undet signfc cyto smr crvx (ASC-US)     Depression     HPV (human papilloma virus) infection     Hyperlipidemia     Hypertension     Mitral valve prolapse     PONV (postoperative nausea and vomiting)       Past Surgical History:   Procedure Laterality Date     SECTION      COLONOSCOPY N/A 2022    Procedure: COLONOSCOPy with biopsy;  Surgeon: Mariely Kaur MD;  Location: [facility] ENDOSCOPY;  Service: Gastroenterology;  Laterality: N/A;  colon polyps, hemerrhoids    LAPAROSCOPIC CHOLECYSTECTOMY      TUBAL ABDOMINAL LIGATION        Family History   Problem Relation Age of Onset    Colon cancer Father 74    Liver cancer Father 74    Hypertension Father     Diabetes Mother     Breast cancer Neg Hx     Uterine cancer Neg Hx     Ovarian cancer Neg Hx      Allergies as of 2025 - Reviewed 2025   Allergen Reaction Noted    Sertraline Palpitations 2021        PCP: does manage PMHx and preventative labs    /86   Pulse 74   Wt 83.9 kg (185 lb)   LMP 2025 (Exact Date)   BMI 29.86 kg/m²     PHYSICAL EXAM: Chaperone present   General- NAD, alert and oriented, appropriate  Psych- Normal mood, good memory  Neck- No masses, no thyroid enlargement  Lymphatic- No palpable neck, axillary, or groin nodes  CV- Regular rhythm, no murmurs  Resp- CTA to bases, no wheezes  Abdomen- Soft, non distended, non tender, no masses  Breast left-  Bilaterally  symmetrical, no masses, non tender, no nipple discharge  Breast right- Bilaterally symmetrical, no masses, non tender, no nipple discharge  External genitalia- Normal female, no lesions, no atrophy  Urethra/meatus- Normal, no masses, non tender, no prolapse  Bladder- Normal, no masses, non tender, no prolapse  Vagina- Normal, no atrophy, no lesions, no discharge, no prolapse  Cvx- Normal, no lesions, no discharge, No cervical motion tenderness  Uterus- Normal size, shape & consistency.  Non tender, mobile.  Adnexa- No mass, non tender  Anus/Rectum/Perineum- Not performed  Ext- No edema, no cyanosis    Skin- No lesions, no rashes, no acanthosis nigricans    ASSESSMENT and PLAN:    Diagnoses and all orders for this visit:    1. Well woman exam (Primary)  -     IgP, Aptima HPV    2. Encounter for screening mammogram for malignant neoplasm of breast  -     Mammo Screening Digital Tomosynthesis Bilateral With CAD; Future      Preventative:   BREAST HEALTH- Monthly self breast exam importance and how to reviewed. MMG and/or MRI (prn) reviewed per society guidelines and her individual history. Screening Imaging: Updated today  CERVICAL CANCER Screening- Reviewed current ASCCP guidelines for screening w and wo cotest HPV, age specific.  Screen: Updated today  COLON CANCER Screening- Reviewed current medical society guidelines and options.  Screen:  Patient will call to schedule  BONE HEALTH- Reviewed current medical society guidelines and options for testing, calcium and vit D intake.  Weight bearing exercise.  DEXA: not medically needed  VACCINATIONS Recommended: shingles at 50  TJHereditary cancer screening:Doesn't quality  Smoking status- NON SMOKER  Follow up PCP/Specialist PMHx and Labs  TRACK MENSES, RTO if <q21d, >7d long, heavy or painful.     She understands the importance of having any ordered tests to be performed in a timely fashion.  The risks of not performing them include, but are not limited to, advanced  cancer stages, bone loss from osteoporosis and/or subsequent increase in morbidity and/or mortality.  She is encouraged to review her results online and/or contact or office if she has questions.       Follow Up:  Return in about 1 year (around 4/21/2026).        Sia Yun, APRN  04/21/2025

## 2025-04-21 ENCOUNTER — OFFICE VISIT (OUTPATIENT)
Dept: OBSTETRICS AND GYNECOLOGY | Age: 50
End: 2025-04-21
Payer: COMMERCIAL

## 2025-04-21 VITALS
HEART RATE: 74 BPM | BODY MASS INDEX: 29.86 KG/M2 | DIASTOLIC BLOOD PRESSURE: 86 MMHG | SYSTOLIC BLOOD PRESSURE: 124 MMHG | WEIGHT: 185 LBS

## 2025-04-21 DIAGNOSIS — Z01.419 WELL WOMAN EXAM: Primary | ICD-10-CM

## 2025-04-21 DIAGNOSIS — Z12.31 ENCOUNTER FOR SCREENING MAMMOGRAM FOR MALIGNANT NEOPLASM OF BREAST: ICD-10-CM

## 2025-04-21 PROCEDURE — G0123 SCREEN CERV/VAG THIN LAYER: HCPCS | Performed by: NURSE PRACTITIONER

## 2025-04-21 PROCEDURE — 87624 HPV HI-RISK TYP POOLED RSLT: CPT | Performed by: NURSE PRACTITIONER

## 2025-04-24 LAB
CYTOLOGIST CVX/VAG CYTO: ABNORMAL
CYTOLOGY CVX/VAG DOC CYTO: ABNORMAL
CYTOLOGY CVX/VAG DOC THIN PREP: ABNORMAL
DX ICD CODE: ABNORMAL
DX ICD CODE: ABNORMAL
HPV I/H RISK 4 DNA CVX QL PROBE+SIG AMP: POSITIVE
OTHER STN SPEC: ABNORMAL
PATHOLOGIST CVX/VAG CYTO: ABNORMAL
SERVICE CMNT-IMP: ABNORMAL
STAT OF ADQ CVX/VAG CYTO-IMP: ABNORMAL

## 2025-04-25 ENCOUNTER — RESULTS FOLLOW-UP (OUTPATIENT)
Dept: OBSTETRICS AND GYNECOLOGY | Age: 50
End: 2025-04-25
Payer: COMMERCIAL

## 2025-06-09 NOTE — PROGRESS NOTES
"Colposcopy    CC:  Pt presents for colposcopy  Chief Complaint   Patient presents with    Colposcopy         Tobacco/Nicotine use:  no  Consent signed: yes  S/p Gardisil vaccine: no    Pap result: LSIL HPV Positive     Procedure reviewed in detail.  She understands the potential risks include, but are not limited to, pain, bleeding, and infection.  Her questions have been answered.    Subjective/HPI:  49 y.o.   Social History     Substance and Sexual Activity   Sexual Activity Not Currently    Partners: Male    Birth control/protection: Tubal ligation    Comment: salpingectomy       49-year-old female G1, P1 with LMP 2025 here for colposcopy.  Patient with a Pap smear showed low-grade squamous intraepithelial neoplasia.  HPV positive    Vital Signs:  /77   Pulse 87   Ht 167.6 cm (66\")   Wt 84.4 kg (186 lb)   LMP 2025   Breastfeeding No   BMI 30.02 kg/m²     Physical Exam  Vitals and nursing note reviewed. Exam conducted with a chaperone present.   Constitutional:       Appearance: Normal appearance.   Skin:     General: Skin is warm and dry.   Neurological:      General: No focal deficit present.      Mental Status: She is alert and oriented to person, place, and time.   Psychiatric:         Mood and Affect: Mood normal.         Behavior: Behavior normal.       External genitalia- Without lesion   Perineum- Without lesion  Vagina- Without lesion   Cervix- Inadequate, AWL, MZ, Biopsies taken at lesion site/s 12:00, ECC performed, Monsels for hemostasis, Hemostatic     Patient tolerated the procedure well.  Chaperone present during pelvic exam.    Assessment and Plan:  Diagnoses and all orders for this visit:    1. Cervical dysplasia (Primary)  -     Tissue Pathology Exam        Counseling:    We discussed her Pap diagnosis and HPV in detail.  HPV incidence, transmission, course, remission, progression, types, cervical cancer, genital warts, monitoring, and importance of compliance were " reviewed.  Importance of maintaining a healthy lifestyle    Recommend continued routine follow up.  Discussed the importance of follow up as directed and consequences with lack of follow up (i.e. potential for cervical cancer).      PRECAUTIONS - It is common to have bright red spotting and dark discharge after today if biopsies performed.  After biopsies, avoid intercourse, tampons, tub bath or pool for 7 days.  Use OTC pain relievers prn.  Return to office or ER (if after hours/weekends) for severe pelvic pain, bleeding > 1 pad/2 hours, discharge that has a bad vaginal odor or vaginal itching, fever > 101.5, or any other concerns.        Follow Up:  No follow-ups on file.      Thais Alegria DO  06/11/2025    [facility] [address] [facility]  [address] [place] [address] [address] [place] [address]  Dept: [phone]  Dept Fax: [phone]  Loc: [phone]  Loc Fax: [phone]

## 2025-06-11 ENCOUNTER — PROCEDURE VISIT (OUTPATIENT)
Dept: OBSTETRICS AND GYNECOLOGY | Age: 50
End: 2025-06-11
Payer: COMMERCIAL

## 2025-06-11 VITALS
WEIGHT: 186 LBS | BODY MASS INDEX: 29.89 KG/M2 | DIASTOLIC BLOOD PRESSURE: 77 MMHG | HEIGHT: 66 IN | SYSTOLIC BLOOD PRESSURE: 127 MMHG | HEART RATE: 87 BPM

## 2025-06-11 DIAGNOSIS — N87.9 CERVICAL DYSPLASIA: Primary | ICD-10-CM

## 2025-06-11 PROCEDURE — 88305 TISSUE EXAM BY PATHOLOGIST: CPT | Performed by: OBSTETRICS & GYNECOLOGY

## 2025-06-13 LAB
CYTO UR: NORMAL
LAB AP CASE REPORT: NORMAL
LAB AP CLINICAL INFORMATION: NORMAL
PATH REPORT.FINAL DX SPEC: NORMAL
PATH REPORT.GROSS SPEC: NORMAL

## (undated) DEVICE — SINGLE-USE BIOPSY FORCEPS: Brand: RADIAL JAW 4

## (undated) DEVICE — Device: Brand: DEFENDO AIR/WATER/SUCTION AND BIOPSY VALVE

## (undated) DEVICE — SOL IRRG H2O PL/BG 1000ML STRL

## (undated) DEVICE — COLON KIT: Brand: MEDLINE INDUSTRIES, INC.